# Patient Record
Sex: MALE | Race: BLACK OR AFRICAN AMERICAN | URBAN - METROPOLITAN AREA
[De-identification: names, ages, dates, MRNs, and addresses within clinical notes are randomized per-mention and may not be internally consistent; named-entity substitution may affect disease eponyms.]

---

## 2017-06-22 ENCOUNTER — HOSPITAL ENCOUNTER (INPATIENT)
Facility: CLINIC | Age: 37
LOS: 8 days | Discharge: HOME | End: 2017-07-01
Attending: EMERGENCY MEDICINE | Admitting: INTERNAL MEDICINE
Payer: MEDICAID

## 2017-06-22 ENCOUNTER — APPOINTMENT (OUTPATIENT)
Dept: GENERAL RADIOLOGY | Facility: CLINIC | Age: 37
End: 2017-06-22
Attending: EMERGENCY MEDICINE
Payer: MEDICAID

## 2017-06-22 ENCOUNTER — APPOINTMENT (OUTPATIENT)
Dept: CT IMAGING | Facility: CLINIC | Age: 37
End: 2017-06-22
Attending: EMERGENCY MEDICINE
Payer: MEDICAID

## 2017-06-22 DIAGNOSIS — R10.12 LUQ ABDOMINAL PAIN: ICD-10-CM

## 2017-06-22 DIAGNOSIS — R11.2 INTRACTABLE VOMITING WITH NAUSEA, UNSPECIFIED VOMITING TYPE: ICD-10-CM

## 2017-06-22 PROBLEM — K52.9 GASTROENTERITIS: Status: ACTIVE | Noted: 2017-06-22

## 2017-06-22 LAB
ALBUMIN SERPL-MCNC: 3.4 G/DL (ref 3.4–5)
ALBUMIN UR-MCNC: NEGATIVE MG/DL
ALP SERPL-CCNC: 46 U/L (ref 40–150)
ALT SERPL W P-5'-P-CCNC: 20 U/L (ref 0–70)
ANION GAP SERPL CALCULATED.3IONS-SCNC: 8 MMOL/L (ref 3–14)
APPEARANCE UR: CLEAR
AST SERPL W P-5'-P-CCNC: 11 U/L (ref 0–45)
BASOPHILS # BLD AUTO: 0 10E9/L (ref 0–0.2)
BASOPHILS NFR BLD AUTO: 0.2 %
BILIRUB SERPL-MCNC: 0.8 MG/DL (ref 0.2–1.3)
BILIRUB UR QL STRIP: NEGATIVE
BUN SERPL-MCNC: 8 MG/DL (ref 7–30)
CALCIUM SERPL-MCNC: 8.6 MG/DL (ref 8.5–10.1)
CHLORIDE SERPL-SCNC: 101 MMOL/L (ref 94–109)
CHOLEST SERPL-MCNC: 109 MG/DL
CO2 SERPL-SCNC: 28 MMOL/L (ref 20–32)
COLOR UR AUTO: YELLOW
CREAT SERPL-MCNC: 1.14 MG/DL (ref 0.66–1.25)
DIFFERENTIAL METHOD BLD: ABNORMAL
EOSINOPHIL # BLD AUTO: 1.4 10E9/L (ref 0–0.7)
EOSINOPHIL NFR BLD AUTO: 7.1 %
ERYTHROCYTE [DISTWIDTH] IN BLOOD BY AUTOMATED COUNT: 13.2 % (ref 10–15)
GFR SERPL CREATININE-BSD FRML MDRD: 73 ML/MIN/1.7M2
GLUCOSE SERPL-MCNC: 96 MG/DL (ref 70–99)
GLUCOSE UR STRIP-MCNC: NEGATIVE MG/DL
HCT VFR BLD AUTO: 50.3 % (ref 40–53)
HDLC SERPL-MCNC: 36 MG/DL
HGB BLD-MCNC: 16.5 G/DL (ref 13.3–17.7)
HGB UR QL STRIP: NEGATIVE
IMM GRANULOCYTES # BLD: 0.1 10E9/L (ref 0–0.4)
IMM GRANULOCYTES NFR BLD: 0.5 %
KETONES UR STRIP-MCNC: 20 MG/DL
LACTATE BLD-SCNC: 1.4 MMOL/L (ref 0.7–2.1)
LDLC SERPL CALC-MCNC: 57 MG/DL
LEUKOCYTE ESTERASE UR QL STRIP: NEGATIVE
LIPASE SERPL-CCNC: 39 U/L (ref 73–393)
LYMPHOCYTES # BLD AUTO: 0.9 10E9/L (ref 0.8–5.3)
LYMPHOCYTES NFR BLD AUTO: 4.7 %
MCH RBC QN AUTO: 27.5 PG (ref 26.5–33)
MCHC RBC AUTO-ENTMCNC: 32.8 G/DL (ref 31.5–36.5)
MCV RBC AUTO: 84 FL (ref 78–100)
MONOCYTES # BLD AUTO: 0.5 10E9/L (ref 0–1.3)
MONOCYTES NFR BLD AUTO: 2.6 %
MUCOUS THREADS #/AREA URNS LPF: PRESENT /LPF
NEUTROPHILS # BLD AUTO: 16.5 10E9/L (ref 1.6–8.3)
NEUTROPHILS NFR BLD AUTO: 84.9 %
NITRATE UR QL: NEGATIVE
NONHDLC SERPL-MCNC: 73 MG/DL
NRBC # BLD AUTO: 0 10*3/UL
NRBC BLD AUTO-RTO: 0 /100
PH UR STRIP: 6 PH (ref 5–7)
PLATELET # BLD AUTO: 240 10E9/L (ref 150–450)
POTASSIUM SERPL-SCNC: 4 MMOL/L (ref 3.4–5.3)
PROT SERPL-MCNC: 6.6 G/DL (ref 6.8–8.8)
RBC # BLD AUTO: 5.99 10E12/L (ref 4.4–5.9)
RBC #/AREA URNS AUTO: 4 /HPF (ref 0–2)
SODIUM SERPL-SCNC: 137 MMOL/L (ref 133–144)
SP GR UR STRIP: 1.01 (ref 1–1.03)
TRIGL SERPL-MCNC: 79 MG/DL
TROPONIN I SERPL-MCNC: NORMAL UG/L (ref 0–0.04)
URN SPEC COLLECT METH UR: ABNORMAL
UROBILINOGEN UR STRIP-MCNC: 0 MG/DL (ref 0–2)
WBC # BLD AUTO: 19.4 10E9/L (ref 4–11)
WBC #/AREA URNS AUTO: 1 /HPF (ref 0–2)

## 2017-06-22 PROCEDURE — 85025 COMPLETE CBC W/AUTO DIFF WBC: CPT | Performed by: EMERGENCY MEDICINE

## 2017-06-22 PROCEDURE — 87040 BLOOD CULTURE FOR BACTERIA: CPT | Performed by: EMERGENCY MEDICINE

## 2017-06-22 PROCEDURE — 84484 ASSAY OF TROPONIN QUANT: CPT | Performed by: EMERGENCY MEDICINE

## 2017-06-22 PROCEDURE — 99207 ZZC CDG-HISTORY COMP: MEETS EXP. PROBLEM FOCUSED-DOWN CODED LACK OF ROS: CPT | Performed by: INTERNAL MEDICINE

## 2017-06-22 PROCEDURE — 93005 ELECTROCARDIOGRAM TRACING: CPT

## 2017-06-22 PROCEDURE — 74177 CT ABD & PELVIS W/CONTRAST: CPT

## 2017-06-22 PROCEDURE — 96374 THER/PROPH/DIAG INJ IV PUSH: CPT

## 2017-06-22 PROCEDURE — 81001 URINALYSIS AUTO W/SCOPE: CPT | Performed by: EMERGENCY MEDICINE

## 2017-06-22 PROCEDURE — 71020 XR CHEST 2 VW: CPT

## 2017-06-22 PROCEDURE — 80061 LIPID PANEL: CPT | Performed by: INTERNAL MEDICINE

## 2017-06-22 PROCEDURE — 99218 ZZC INITIAL OBSERVATION CARE,LEVL I: CPT | Performed by: INTERNAL MEDICINE

## 2017-06-22 PROCEDURE — 96375 TX/PRO/DX INJ NEW DRUG ADDON: CPT

## 2017-06-22 PROCEDURE — 76604 US EXAM CHEST: CPT

## 2017-06-22 PROCEDURE — 96361 HYDRATE IV INFUSION ADD-ON: CPT

## 2017-06-22 PROCEDURE — 25000128 H RX IP 250 OP 636: Performed by: EMERGENCY MEDICINE

## 2017-06-22 PROCEDURE — 36415 COLL VENOUS BLD VENIPUNCTURE: CPT

## 2017-06-22 PROCEDURE — G0378 HOSPITAL OBSERVATION PER HR: HCPCS

## 2017-06-22 PROCEDURE — 83690 ASSAY OF LIPASE: CPT | Performed by: EMERGENCY MEDICINE

## 2017-06-22 PROCEDURE — 99285 EMERGENCY DEPT VISIT HI MDM: CPT | Mod: 25

## 2017-06-22 PROCEDURE — 80061 LIPID PANEL: CPT | Performed by: EMERGENCY MEDICINE

## 2017-06-22 PROCEDURE — 36415 COLL VENOUS BLD VENIPUNCTURE: CPT | Performed by: EMERGENCY MEDICINE

## 2017-06-22 PROCEDURE — 25000128 H RX IP 250 OP 636: Performed by: INTERNAL MEDICINE

## 2017-06-22 PROCEDURE — 25000132 ZZH RX MED GY IP 250 OP 250 PS 637: Performed by: INTERNAL MEDICINE

## 2017-06-22 PROCEDURE — 25800025 ZZH RX 258: Performed by: INTERNAL MEDICINE

## 2017-06-22 PROCEDURE — 80053 COMPREHEN METABOLIC PANEL: CPT | Performed by: EMERGENCY MEDICINE

## 2017-06-22 PROCEDURE — 83605 ASSAY OF LACTIC ACID: CPT | Performed by: EMERGENCY MEDICINE

## 2017-06-22 RX ORDER — ONDANSETRON 2 MG/ML
4 INJECTION INTRAMUSCULAR; INTRAVENOUS EVERY 6 HOURS PRN
Status: DISCONTINUED | OUTPATIENT
Start: 2017-06-22 | End: 2017-07-01 | Stop reason: HOSPADM

## 2017-06-22 RX ORDER — ONDANSETRON 4 MG/1
4 TABLET, ORALLY DISINTEGRATING ORAL EVERY 6 HOURS PRN
Status: DISCONTINUED | OUTPATIENT
Start: 2017-06-22 | End: 2017-07-01 | Stop reason: HOSPADM

## 2017-06-22 RX ORDER — IOPAMIDOL 755 MG/ML
500 INJECTION, SOLUTION INTRAVASCULAR ONCE
Status: COMPLETED | OUTPATIENT
Start: 2017-06-22 | End: 2017-06-22

## 2017-06-22 RX ORDER — ASPIRIN 325 MG
650 TABLET ORAL DAILY PRN
COMMUNITY

## 2017-06-22 RX ORDER — ACETAMINOPHEN 325 MG/1
650 TABLET ORAL EVERY 4 HOURS PRN
Status: DISCONTINUED | OUTPATIENT
Start: 2017-06-22 | End: 2017-07-01 | Stop reason: HOSPADM

## 2017-06-22 RX ORDER — DEXTROSE MONOHYDRATE, SODIUM CHLORIDE, AND POTASSIUM CHLORIDE 50; 1.49; 9 G/1000ML; G/1000ML; G/1000ML
INJECTION, SOLUTION INTRAVENOUS CONTINUOUS
Status: DISCONTINUED | OUTPATIENT
Start: 2017-06-22 | End: 2017-06-23

## 2017-06-22 RX ORDER — PROCHLORPERAZINE 25 MG
25 SUPPOSITORY, RECTAL RECTAL EVERY 12 HOURS PRN
Status: DISCONTINUED | OUTPATIENT
Start: 2017-06-22 | End: 2017-07-01 | Stop reason: HOSPADM

## 2017-06-22 RX ORDER — HYDROMORPHONE HYDROCHLORIDE 1 MG/ML
0.5 INJECTION, SOLUTION INTRAMUSCULAR; INTRAVENOUS; SUBCUTANEOUS ONCE
Status: COMPLETED | OUTPATIENT
Start: 2017-06-22 | End: 2017-06-22

## 2017-06-22 RX ORDER — NALOXONE HYDROCHLORIDE 0.4 MG/ML
.1-.4 INJECTION, SOLUTION INTRAMUSCULAR; INTRAVENOUS; SUBCUTANEOUS
Status: DISCONTINUED | OUTPATIENT
Start: 2017-06-22 | End: 2017-07-01 | Stop reason: HOSPADM

## 2017-06-22 RX ORDER — ONDANSETRON 2 MG/ML
4 INJECTION INTRAMUSCULAR; INTRAVENOUS ONCE
Status: COMPLETED | OUTPATIENT
Start: 2017-06-22 | End: 2017-06-22

## 2017-06-22 RX ORDER — HYDROMORPHONE HYDROCHLORIDE 1 MG/ML
.3-.5 INJECTION, SOLUTION INTRAMUSCULAR; INTRAVENOUS; SUBCUTANEOUS
Status: DISCONTINUED | OUTPATIENT
Start: 2017-06-22 | End: 2017-06-23

## 2017-06-22 RX ORDER — OXYCODONE HYDROCHLORIDE 5 MG/1
5-10 TABLET ORAL
Status: DISCONTINUED | OUTPATIENT
Start: 2017-06-22 | End: 2017-06-30

## 2017-06-22 RX ORDER — LIDOCAINE 40 MG/G
CREAM TOPICAL
Status: DISCONTINUED | OUTPATIENT
Start: 2017-06-22 | End: 2017-07-01 | Stop reason: HOSPADM

## 2017-06-22 RX ORDER — PROCHLORPERAZINE MALEATE 5 MG
5-10 TABLET ORAL EVERY 6 HOURS PRN
Status: DISCONTINUED | OUTPATIENT
Start: 2017-06-22 | End: 2017-07-01 | Stop reason: HOSPADM

## 2017-06-22 RX ADMIN — HYDROMORPHONE HYDROCHLORIDE 0.5 MG: 1 INJECTION, SOLUTION INTRAMUSCULAR; INTRAVENOUS; SUBCUTANEOUS at 23:33

## 2017-06-22 RX ADMIN — ACETAMINOPHEN 650 MG: 325 TABLET, FILM COATED ORAL at 23:40

## 2017-06-22 RX ADMIN — ONDANSETRON 4 MG: 2 INJECTION INTRAMUSCULAR; INTRAVENOUS at 18:45

## 2017-06-22 RX ADMIN — IOPAMIDOL 100 ML: 755 INJECTION, SOLUTION INTRAVENOUS at 19:26

## 2017-06-22 RX ADMIN — SODIUM CHLORIDE 1000 ML: 9 INJECTION, SOLUTION INTRAVENOUS at 22:15

## 2017-06-22 RX ADMIN — HYDROMORPHONE HYDROCHLORIDE 0.5 MG: 1 INJECTION, SOLUTION INTRAMUSCULAR; INTRAVENOUS; SUBCUTANEOUS at 18:47

## 2017-06-22 RX ADMIN — SODIUM CHLORIDE 1000 ML: 9 INJECTION, SOLUTION INTRAVENOUS at 18:45

## 2017-06-22 RX ADMIN — SODIUM CHLORIDE 65 ML: 9 INJECTION, SOLUTION INTRAVENOUS at 19:27

## 2017-06-22 RX ADMIN — POTASSIUM CHLORIDE, DEXTROSE MONOHYDRATE AND SODIUM CHLORIDE: 150; 5; 900 INJECTION, SOLUTION INTRAVENOUS at 23:38

## 2017-06-22 RX ADMIN — RANITIDINE 150 MG: 150 TABLET ORAL at 23:40

## 2017-06-22 ASSESSMENT — ENCOUNTER SYMPTOMS
DIAPHORESIS: 1
DYSURIA: 0
VOMITING: 1
HEMATURIA: 0
ABDOMINAL PAIN: 1
NAUSEA: 1
DIARRHEA: 0
COUGH: 0
FEVER: 1
SORE THROAT: 0

## 2017-06-22 NOTE — ED PROVIDER NOTES
"  History     Chief Complaint:  Abdominal pain and chest pain    HPI   René Grigsby is a 36 year old male with a history of CKD who presents with abdominal pain and chest pain. Two days ago, the patient began experiencing intermittent 20 second episodes of upper abdominal pain every 3-4 minutes. Last night, the patient had a fever of 103. Around 0400 this morning, the patient began to experience nausea and vomiting and noticed a small amount of blood in his vomit. His last bowel movement was yesterday which is abnormal for him and he has not been urinating as much as he usually does. No dysuria or hematuria. No diarrhea. No cough or sore throat. He has not been around anyone else who has been sick. No recent travel.     The patient also reports intermittent left sided chest pain with activity for the past 2-3 months. The patient reports experiencing an episode of chest pain and diaphoresis while jogging during a softball game a week ago. He does not have any chest pain currently.    Allergies:  No known drug allergies.    Medications:    Aspirin    Past Medical History:    CKD    Past Surgical History:    History reviewed. No pertinent past surgical history.    Family History:    History reviewed. No pertinent family history.    Social History:  Marital Status: single  Presents to the ED with his family  Tobacco Use: former smoker  Alcohol Use: negative  [facility]     Review of Systems   Constitutional: Positive for diaphoresis and fever.   HENT: Negative for sore throat.    Respiratory: Negative for cough.    Cardiovascular: Positive for chest pain.   Gastrointestinal: Positive for abdominal pain, nausea and vomiting. Negative for diarrhea.   Genitourinary: Negative for dysuria and hematuria.   All other systems reviewed and are negative.    Physical Exam   First Vitals:  BP: 117/74  Pulse: 120  Temp: 100.1  F (37.8  C)  Resp: 18  Height: 175.3 cm (5' 9\")  Weight: 102.1 kg (225 " lb)  SpO2: 100 %    Physical Exam  Constitutional: The patient is oriented to person, place, and time. Alert and cooperative.  HENT:   Right Ear: External ear normal.   Left Ear: External ear normal.   Nose: Nose normal.   Mouth/Throat: Uvula is midline, oropharynx is clear and moist and mucous membranes are normal. No posterior oropharyngeal edema or erythema.   Eyes: Conjunctivae, EOM and lids are normal. Pupils are equal, round, and reactive to light.   Neck: Trachea normal. Normal range of motion. Neck supple.   Cardiovascular: tachycardia, regular rhythm, normal heart sounds, and intact distal pulses.    Pulmonary/Chest: Effort normal and breath sounds equal bilaterally. No crackles or wheezing.   Abdominal: Soft. Tenderness to palpation in the epigastrium and throughout the L abdomen. No rebound and no guarding.   Musculoskeletal: Normal range of motion.  No extremity tenderness or edema.  Neurological: Alert and Oriented. Strength 5/5 in upper and lower extremities bilaterally. Sensation intact to light touch throughout.  Skin: Skin is dry. No rash noted.          Emergency Department Course   ECG:  @ 1848  Indication: abdominal pain and chest pain  Vent. Rate 108 bpm. FL interval 124 ms. QRS duration 88 ms. QT/QTc 326/436 ms. P-R-T axis 56 46 19.   Sinus tachycardia. Otherwise normal ECG.    Read @ 1854 by Dr. Myers.    Imaging:  Radiographic findings were communicated with the patient who voiced understanding of the findings.    CT Abdomen Pelvis w Contrast  1. Trace bilateral pleural effusions and trace pericardial effusion.  2. The appendix is at the upper limits of normal in size. No  surrounding inflammation and no convincing evidence of appendicitis.  3. No other acute abnormality. No bowel obstruction or inflammation.   Preliminary radiology read.      Chest XR, PA & LAT  No acute abnormality.   Preliminary radiology read.      Laboratory:  CBC:  WBC 19.4 (H), HGB 16.5,   CMP: protein total  6.6 (L), otherwise WNL (Creatinine 1.14)  Lipase: 39 (L)  Troponin: <0.015  Lactic acid: 1.4  Blood culture x2: pending    UA: Clear yellow urine, urineketon 20, RBC/HPF 4 (H), mucous present, otherwise WNL    Procedures:    Limited Bedside Screening Ultrasound     PERFORMED BY: Dr. Myers  BODY AREA IMAGED: Cardiac  INDICATIONS: Chest pain  FINDINGS: Trace pericardial Effusion. No obvious wall motion abnormalities. Good cardiac squeeze.         Interventions:  (1957) Normal Saline, 1 liter, IV bolus  (1847) Dilaudid, 0.5 mg, IV injection  (1845) Zofran, 4 mg, IV injection    Emergency Department Course:  Nursing notes and vitals reviewed.  I performed an exam of the patient as documented above.   A peripheral IV was established. Blood was drawn from the patient. This was sent for laboratory testing, findings above.   Urine sample was obtained and sent for laboratory analysis, findings above.  The patient was sent for a abdomen/pelvis CT and chest x-ray while in the emergency department, findings above.   I performed a bedside ultrasound as documented above.  Findings and plan explained to the patient who consents to admission.   (2156) I discussed the patient with Dr. Rosenberg of the hospitalist service, who will admit the patient to a observation bed for further monitoring, evaluation, and treatment.  I personally reviewed the laboratory results with the patient and answered all related questions prior to discharge.     Impression & Plan    Medical Decision Making:  René Grigsby is a 36 year old male who presents to the ED for evaluation of abdominal pain and fever. Upon presentation to the ED, the patient is nontoxic appearing. He is tachycardic and has a low grade temperature, though his vital signs are otherwise within normal limits and stable. On exam, he is well appearing. He is alert, oriented, and neuro exam is nonfocal. Aside from tachycardia, cardiopulmonary exam is unremarkable. On abdominal  examination, he does endorse tenderness to palpation in the epigastrium and throughout the left side of the abdomen. He has no significant tenderness to palpation throughout the right abdomen. The rest of his exam is as mentioned above. Given that the patient does endorse a temperature of 103 at home and is tachycardic, a septic workup was performed. Labs were obtained and are as mentioned above. Notably, he does have a leukocytosis, but lactate is within normal limits. CT of the abdomen was obtained initially and demonstrates trace bilateral pleural effusions and a trace pericardial effusion. The appendix is at the upper limits of normal size, however there is no convincing evidence of appendicitis. There is no other acute abnormality noted. These results were discussed with the patient, he notes understanding. A chest x-ray was then obtained and demonstrates no acute abnormality. I did perform a bedside ultrasound of the heart and this demonstrates a trace pleural effusion, but no evidence of a large pleural effusion. EKG demonstrates sinus rhythm. There are no concerning acute ischemic changes. There is no evidence to suggest an acute pericarditis. Overall, the etiology of the patient's fever and leukocytosis is unclear at this time. He has no evidence of an intraabdominal infectious process, evidence of pneumonia, or evidence of urinary tract infection. He denies any rash to suggest cellulitis. The patient continues to endorse significant abdominal pain and states he has been unable to hold fluids or food down at home. Therefore, I do feel that observation admission for symptomatic management is indicated at this time. Although the patient does have a leukocytosis with a low grade temperature here, given that there is no obvious evidence of a bacterial infection, I do not feel that antibiotics are indicated at this time. The patient was discussed with the hospitalist and he agreed to accept the patient. He was  stable/improved at the time of admission.      Diagnosis:    ICD-10-CM    1. LUQ abdominal pain R10.12 Blood culture     Blood culture   2. Intractable vomiting with nausea, unspecified vomiting type R11.2        Disposition:  Admit to observation.    Rancho SHAY, am serving as a scribe on 6/22/2017 at 6:29 PM to personally document services performed by Dr. Myers based on my observations and the provider's statements to me.        Maribel Myers MD  06/23/17 0212

## 2017-06-22 NOTE — ED NOTES
Pt developed abd with nausea and vomiting on Tuesday. Pt has been running a fever up to 103 on Tuesday night. No vomiting today. Pt has not been able to keep anything down except bread and water. Pt denies diarrhea or constipation.

## 2017-06-22 NOTE — IP AVS SNAPSHOT
MRN:[id number]                      After Visit Summary   6/22/2017    René Grigsby    MRN: [id number]           Thank you!     Thank you for choosing [facility] for your care. Our goal is always to provide you with excellent care. Hearing back from our patients is one way we can continue to improve our services. Please take a few minutes to complete the written survey that you may receive in the mail after you visit. If you would like to speak to someone directly about your visit please contact Patient Relations at [phone]. Thank you!          Patient Information     Date Of Birth          1980        Designated Caregiver       Most Recent Value    Caregiver    Will someone help with your care after discharge? yes    Name of designated caregiver Kanu [wife]    Phone number of caregiver [phone]    Caregiver address [place]      About your hospital stay     You were admitted on:  June 22, 2017 You last received care in the:  [place] [address] [facility]    You were discharged on:  July 1, 2017       Who to Call     For medical emergencies, please call 911.  For non-urgent questions about your medical care, please call your primary care provider or clinic, [phone]          Attending Provider     Provider Specialty    Maribel Myers MD Emergency Medicine    Yousuf Rosenberg MD Internal Medicine    Amy Jackson DO Internal Medicine       Primary Care Provider Office Phone # Fax #    [facility] [phone]      Follow-up Appointments     Follow-up and recommended labs and tests        F/U with primary MD on Monday or Wednesday for recheck of CBC and to f/u some outstanding blood work  Consider referral to rheumatology at that time  F/U with Dr. Mccullough in 7-10 days  You have a mildly dilated right ventricle, you should have repeat echo in 6 months  You have a strong family hx of coronary disease, your  "primary may consider more specialized lipid testing                  Pending Results     Date and Time Order Name Status Description    2017 1531 Antineutrophil cytoplasmic Nayeli IgG In process     2017 0850 EBV Capsid Antibody IgM In process     2017 0850 Ova and Parasite Exam Routine In process     2017 0850 Strongyloides antibody IgG In process     2017 0001 Porphyrins Total Reflx Fraction In process     2017 1740 Porphyrins Quantitative Urine In process     2017 1239 Lyme IgG and IgM CSF Immunoblot: Tube 3 In process             Statement of Approval     Ordered          17 1704  I have reviewed and agree with all the recommendations and orders detailed in this document.  EFFECTIVE NOW     Approved and electronically signed by:  Teresita Chavis MD             Admission Information     Date & Time Provider Department Dept. Phone    [address] Amy Jackson,  [place] [address] [facility] [phone]      Your Vitals Were     Blood Pressure Pulse Temperature Respirations Height Weight    109/65 (BP Location: Right arm) 77 97  F (36.1  C) (Axillary) 16 1.753 m (5' 9\") 100.4 kg (221 lb 4.8 oz)    Pulse Oximetry BMI (Body Mass Index)                97% 32.68 kg/m2          MyChart Information     [employer] lets you send messages to your doctor, view your test results, renew your prescriptions, schedule appointments and more. To sign up, go to [email or web address] . Click on \"Log in\" on the left side of the screen, which will take you to the Welcome page. Then click on \"Sign up Now\" on the right side of the page.     You will be asked to enter the access code listed below, as well as some personal information. Please follow the directions to create your username and password.     Your access code is: [email or web address]  Expires: 2017  8:43 PM     Your access code will  in 90 days. If you need help or a new code, please call your [place] clinic or " [phone].        Care EveryWhere ID     This is your Care EveryWhere ID. This could be used by other organizations to access your [facility] medical records  [id number]        Equal Access to Services     MIGUEL ANGEL ADDISON : Hadii aad ku hadallenoswaldo Matthew, waaxda luqadaha, qaybta kaalmada adejaved, taiwo jain deni qiu. So [facility] [phone].    ATENCIÓN: Si habla español, tiene a gonzalez disposición servicios gratuitos de asistencia lingüística. Llame al [phone].    We comply with applicable federal civil rights laws and [place] laws. We do not discriminate on the basis of race, color, national origin, age, disability sex, sexual orientation or gender identity.               Review of your medicines      START taking        Dose / Directions    HYDROmorphone 2 MG tablet   Commonly known as:  DILAUDID   Used for:  LUQ abdominal pain        Dose:  2 mg   Take 1 tablet (2 mg) by mouth every 4 hours as needed for moderate to severe pain   Quantity:  20 tablet   Refills:  0         CONTINUE these medicines which have NOT CHANGED        Dose / Directions    aspirin 325 MG tablet        Dose:  650 mg   Take 650 mg by mouth daily as needed   Refills:  0            Where to get your medicines      Some of these will need a paper prescription and others can be bought over the counter. Ask your nurse if you have questions.     Bring a paper prescription for each of these medications     HYDROmorphone 2 MG tablet                Protect others around you: Learn how to safely use, store and throw away your medicines at www.disposemymeds.org.             Medication List: This is a list of all your medications and when to take them. Check marks below indicate your daily home schedule. Keep this list as a reference.      Medications           Morning Afternoon Evening Bedtime As Needed    aspirin 325 MG tablet   Take 650 mg by mouth daily as needed                                HYDROmorphone 2 MG tablet    Commonly known as:  DILAUDID   Take 1 tablet (2 mg) by mouth every 4 hours as needed for moderate to severe pain   Last time this was given:  2 mg on 7/1/2017  3:22 PM

## 2017-06-22 NOTE — IP AVS SNAPSHOT
[place] [address] [facility]    [address]    [facility] [address]    Phone:  [phone]    Fax:  [phone]                                       After Visit Summary   6/22/2017    René Grigsby    MRN: [id number]           After Visit Summary Signature Page     I have received my discharge instructions, and my questions have been answered. I have discussed any challenges I see with this plan with the nurse or doctor.    ..........................................................................................................................................  Patient/Patient Representative Signature      ..........................................................................................................................................  Patient Representative Print Name and Relationship to Patient    ..................................................               ................................................  Date                                            Time    ..........................................................................................................................................  Reviewed by Signature/Title    ...................................................              ..............................................  Date                                                            Time

## 2017-06-23 ENCOUNTER — APPOINTMENT (OUTPATIENT)
Dept: CT IMAGING | Facility: CLINIC | Age: 37
End: 2017-06-23
Attending: INTERNAL MEDICINE
Payer: MEDICAID

## 2017-06-23 ENCOUNTER — APPOINTMENT (OUTPATIENT)
Dept: ULTRASOUND IMAGING | Facility: CLINIC | Age: 37
End: 2017-06-23
Attending: INTERNAL MEDICINE
Payer: MEDICAID

## 2017-06-23 PROBLEM — R10.9 ABDOMINAL PAIN: Status: ACTIVE | Noted: 2017-06-23

## 2017-06-23 LAB
ALBUMIN SERPL-MCNC: 2.3 G/DL (ref 3.4–5)
ALP SERPL-CCNC: 40 U/L (ref 40–150)
ALT SERPL W P-5'-P-CCNC: 13 U/L (ref 0–70)
ANION GAP SERPL CALCULATED.3IONS-SCNC: 6 MMOL/L (ref 3–14)
AST SERPL W P-5'-P-CCNC: 8 U/L (ref 0–45)
BASOPHILS # BLD AUTO: 0 10E9/L (ref 0–0.2)
BASOPHILS NFR BLD AUTO: 0.1 %
BILIRUB SERPL-MCNC: 0.6 MG/DL (ref 0.2–1.3)
BUN SERPL-MCNC: 8 MG/DL (ref 7–30)
CALCIUM SERPL-MCNC: 7.7 MG/DL (ref 8.5–10.1)
CHLORIDE SERPL-SCNC: 103 MMOL/L (ref 94–109)
CK SERPL-CCNC: 98 U/L (ref 30–300)
CO2 SERPL-SCNC: 27 MMOL/L (ref 20–32)
CREAT SERPL-MCNC: 1.27 MG/DL (ref 0.66–1.25)
D DIMER PPP FEU-MCNC: 1.5 UG/ML FEU (ref 0–0.5)
DIFFERENTIAL METHOD BLD: ABNORMAL
EOSINOPHIL # BLD AUTO: 1.3 10E9/L (ref 0–0.7)
EOSINOPHIL NFR BLD AUTO: 8 %
ERYTHROCYTE [DISTWIDTH] IN BLOOD BY AUTOMATED COUNT: 13.5 % (ref 10–15)
ERYTHROCYTE [SEDIMENTATION RATE] IN BLOOD BY WESTERGREN METHOD: 4 MM/H (ref 0–15)
GFR SERPL CREATININE-BSD FRML MDRD: 64 ML/MIN/1.7M2
GLUCOSE SERPL-MCNC: 119 MG/DL (ref 70–99)
HCT VFR BLD AUTO: 49.4 % (ref 40–53)
HGB BLD-MCNC: 16.3 G/DL (ref 13.3–17.7)
IMM GRANULOCYTES # BLD: 0.1 10E9/L (ref 0–0.4)
IMM GRANULOCYTES NFR BLD: 0.6 %
INTERPRETATION ECG - MUSE: NORMAL
LACTATE BLD-SCNC: 0.9 MMOL/L (ref 0.7–2.1)
LIPASE SERPL-CCNC: 34 U/L (ref 73–393)
LYMPHOCYTES # BLD AUTO: 0.8 10E9/L (ref 0.8–5.3)
LYMPHOCYTES NFR BLD AUTO: 4.8 %
MCH RBC QN AUTO: 28.1 PG (ref 26.5–33)
MCHC RBC AUTO-ENTMCNC: 33 G/DL (ref 31.5–36.5)
MCV RBC AUTO: 85 FL (ref 78–100)
MONOCYTES # BLD AUTO: 0.3 10E9/L (ref 0–1.3)
MONOCYTES NFR BLD AUTO: 2.1 %
NEUTROPHILS # BLD AUTO: 13.4 10E9/L (ref 1.6–8.3)
NEUTROPHILS NFR BLD AUTO: 84.4 %
NRBC # BLD AUTO: 0 10*3/UL
NRBC BLD AUTO-RTO: 0 /100
PLATELET # BLD AUTO: 207 10E9/L (ref 150–450)
POTASSIUM SERPL-SCNC: 4.7 MMOL/L (ref 3.4–5.3)
PROCALCITONIN SERPL-MCNC: 0.19 NG/ML
PROT SERPL-MCNC: 5.5 G/DL (ref 6.8–8.8)
RBC # BLD AUTO: 5.81 10E12/L (ref 4.4–5.9)
SODIUM SERPL-SCNC: 136 MMOL/L (ref 133–144)
WBC # BLD AUTO: 15.9 10E9/L (ref 4–11)
WBC # BLD AUTO: 20.5 10E9/L (ref 4–11)

## 2017-06-23 PROCEDURE — 25000128 H RX IP 250 OP 636: Performed by: INTERNAL MEDICINE

## 2017-06-23 PROCEDURE — 84145 PROCALCITONIN (PCT): CPT | Performed by: INTERNAL MEDICINE

## 2017-06-23 PROCEDURE — 96376 TX/PRO/DX INJ SAME DRUG ADON: CPT

## 2017-06-23 PROCEDURE — 25000132 ZZH RX MED GY IP 250 OP 250 PS 637: Performed by: INTERNAL MEDICINE

## 2017-06-23 PROCEDURE — 90471 IMMUNIZATION ADMIN: CPT

## 2017-06-23 PROCEDURE — 71260 CT THORAX DX C+: CPT

## 2017-06-23 PROCEDURE — 90715 TDAP VACCINE 7 YRS/> IM: CPT | Performed by: INTERNAL MEDICINE

## 2017-06-23 PROCEDURE — 12000007 ZZH R&B INTERMEDIATE

## 2017-06-23 PROCEDURE — 85025 COMPLETE CBC W/AUTO DIFF WBC: CPT | Performed by: INTERNAL MEDICINE

## 2017-06-23 PROCEDURE — 85048 AUTOMATED LEUKOCYTE COUNT: CPT | Performed by: INTERNAL MEDICINE

## 2017-06-23 PROCEDURE — G0378 HOSPITAL OBSERVATION PER HR: HCPCS

## 2017-06-23 PROCEDURE — 83605 ASSAY OF LACTIC ACID: CPT | Performed by: INTERNAL MEDICINE

## 2017-06-23 PROCEDURE — 96361 HYDRATE IV INFUSION ADD-ON: CPT

## 2017-06-23 PROCEDURE — 25000125 ZZHC RX 250: Performed by: INTERNAL MEDICINE

## 2017-06-23 PROCEDURE — 99233 SBSQ HOSP IP/OBS HIGH 50: CPT | Performed by: INTERNAL MEDICINE

## 2017-06-23 PROCEDURE — 82550 ASSAY OF CK (CPK): CPT | Performed by: INTERNAL MEDICINE

## 2017-06-23 PROCEDURE — 36415 COLL VENOUS BLD VENIPUNCTURE: CPT | Performed by: INTERNAL MEDICINE

## 2017-06-23 PROCEDURE — 93970 EXTREMITY STUDY: CPT

## 2017-06-23 PROCEDURE — 83690 ASSAY OF LIPASE: CPT | Performed by: INTERNAL MEDICINE

## 2017-06-23 PROCEDURE — 85652 RBC SED RATE AUTOMATED: CPT | Performed by: INTERNAL MEDICINE

## 2017-06-23 PROCEDURE — 80053 COMPREHEN METABOLIC PANEL: CPT | Performed by: INTERNAL MEDICINE

## 2017-06-23 PROCEDURE — 93010 ELECTROCARDIOGRAM REPORT: CPT | Performed by: INTERNAL MEDICINE

## 2017-06-23 PROCEDURE — 25800025 ZZH RX 258: Performed by: INTERNAL MEDICINE

## 2017-06-23 PROCEDURE — 85379 FIBRIN DEGRADATION QUANT: CPT | Performed by: INTERNAL MEDICINE

## 2017-06-23 PROCEDURE — 93005 ELECTROCARDIOGRAM TRACING: CPT

## 2017-06-23 RX ORDER — IBUPROFEN 400 MG/1
400 TABLET, FILM COATED ORAL ONCE
Status: COMPLETED | OUTPATIENT
Start: 2017-06-23 | End: 2017-06-23

## 2017-06-23 RX ORDER — HYDROMORPHONE HYDROCHLORIDE 1 MG/ML
.3-.5 INJECTION, SOLUTION INTRAMUSCULAR; INTRAVENOUS; SUBCUTANEOUS
Status: DISCONTINUED | OUTPATIENT
Start: 2017-06-23 | End: 2017-06-30

## 2017-06-23 RX ORDER — IOPAMIDOL 755 MG/ML
500 INJECTION, SOLUTION INTRAVASCULAR ONCE
Status: COMPLETED | OUTPATIENT
Start: 2017-06-23 | End: 2017-06-23

## 2017-06-23 RX ORDER — HYDROMORPHONE HYDROCHLORIDE 1 MG/ML
.3-.5 INJECTION, SOLUTION INTRAMUSCULAR; INTRAVENOUS; SUBCUTANEOUS EVERY 4 HOURS PRN
Status: DISCONTINUED | OUTPATIENT
Start: 2017-06-23 | End: 2017-06-23

## 2017-06-23 RX ORDER — SODIUM CHLORIDE AND POTASSIUM CHLORIDE 150; 900 MG/100ML; MG/100ML
INJECTION, SOLUTION INTRAVENOUS CONTINUOUS
Status: DISCONTINUED | OUTPATIENT
Start: 2017-06-23 | End: 2017-06-26

## 2017-06-23 RX ORDER — ACETAMINOPHEN 10 MG/ML
1000 INJECTION, SOLUTION INTRAVENOUS EVERY 6 HOURS PRN
Status: DISCONTINUED | OUTPATIENT
Start: 2017-06-23 | End: 2017-06-24

## 2017-06-23 RX ADMIN — IOPAMIDOL 85 ML: 755 INJECTION, SOLUTION INTRAVENOUS at 13:47

## 2017-06-23 RX ADMIN — RANITIDINE 150 MG: 150 TABLET ORAL at 21:01

## 2017-06-23 RX ADMIN — ACETAMINOPHEN 650 MG: 325 TABLET, FILM COATED ORAL at 16:29

## 2017-06-23 RX ADMIN — POTASSIUM CHLORIDE AND SODIUM CHLORIDE: 900; 150 INJECTION, SOLUTION INTRAVENOUS at 16:00

## 2017-06-23 RX ADMIN — HYDROMORPHONE HYDROCHLORIDE 0.5 MG: 1 INJECTION, SOLUTION INTRAMUSCULAR; INTRAVENOUS; SUBCUTANEOUS at 16:53

## 2017-06-23 RX ADMIN — SODIUM CHLORIDE 100 ML: 9 INJECTION, SOLUTION INTRAVENOUS at 13:47

## 2017-06-23 RX ADMIN — RANITIDINE 150 MG: 150 TABLET ORAL at 08:38

## 2017-06-23 RX ADMIN — HYDROMORPHONE HYDROCHLORIDE 0.5 MG: 1 INJECTION, SOLUTION INTRAMUSCULAR; INTRAVENOUS; SUBCUTANEOUS at 13:07

## 2017-06-23 RX ADMIN — HYDROMORPHONE HYDROCHLORIDE 0.5 MG: 1 INJECTION, SOLUTION INTRAMUSCULAR; INTRAVENOUS; SUBCUTANEOUS at 21:01

## 2017-06-23 RX ADMIN — HYDROMORPHONE HYDROCHLORIDE 0.5 MG: 1 INJECTION, SOLUTION INTRAMUSCULAR; INTRAVENOUS; SUBCUTANEOUS at 08:38

## 2017-06-23 RX ADMIN — POTASSIUM CHLORIDE, DEXTROSE MONOHYDRATE AND SODIUM CHLORIDE: 150; 5; 900 INJECTION, SOLUTION INTRAVENOUS at 07:26

## 2017-06-23 RX ADMIN — SODIUM CHLORIDE 1000 ML: 9 INJECTION, SOLUTION INTRAVENOUS at 12:58

## 2017-06-23 RX ADMIN — CLOSTRIDIUM TETANI TOXOID ANTIGEN (FORMALDEHYDE INACTIVATED), CORYNEBACTERIUM DIPHTHERIAE TOXOID ANTIGEN (FORMALDEHYDE INACTIVATED), BORDETELLA PERTUSSIS TOXOID ANTIGEN (GLUTARALDEHYDE INACTIVATED), BORDETELLA PERTUSSIS FILAMENTOUS HEMAGGLUTININ ANTIGEN (FORMALDEHYDE INACTIVATED), BORDETELLA PERTUSSIS PERTACTIN ANTIGEN, AND BORDETELLA PERTUSSIS FIMBRIAE 2/3 ANTIGEN 0.5 ML: 5; 2; 2.5; 5; 3; 5 INJECTION, SUSPENSION INTRAMUSCULAR at 10:50

## 2017-06-23 RX ADMIN — ACETAMINOPHEN 650 MG: 325 TABLET, FILM COATED ORAL at 21:18

## 2017-06-23 RX ADMIN — IBUPROFEN 400 MG: 400 TABLET ORAL at 23:18

## 2017-06-23 ASSESSMENT — ACTIVITIES OF DAILY LIVING (ADL)
DRESS: 0-->INDEPENDENT
SWALLOWING: 0-->SWALLOWS FOODS/LIQUIDS WITHOUT DIFFICULTY
TRANSFERRING: 0-->INDEPENDENT
AMBULATION: 0-->INDEPENDENT
RETIRED_COMMUNICATION: 0-->UNDERSTANDS/COMMUNICATES WITHOUT DIFFICULTY
BATHING: 0-->INDEPENDENT
FALL_HISTORY_WITHIN_LAST_SIX_MONTHS: NO
COGNITION: 0 - NO COGNITION ISSUES REPORTED
RETIRED_EATING: 0-->INDEPENDENT
TOILETING: 0-->INDEPENDENT

## 2017-06-23 ASSESSMENT — PAIN DESCRIPTION - DESCRIPTORS
DESCRIPTORS: CRAMPING
DESCRIPTORS: SHARP

## 2017-06-23 NOTE — ED NOTES
[facility] [place]  ED Nurse Handoff Report    René Grigsby is a 36 year old male   ED Chief complaint: Abdominal Pain and Back Pain  . ED Diagnosis:   Final diagnoses:   LUQ abdominal pain   Intractable vomiting with nausea, unspecified vomiting type     Allergies: No Known Allergies    Code Status: Full Code  Activity level - Baseline/Home:  Independent. Activity Level - Current:   Stand with Assist. Lift room needed: No. Bariatric: No   Needed: No   Isolation: No. Infection: Not Applicable.     Vital Signs:   Vitals:    06/22/17 2045 06/22/17 2100 06/22/17 2214 06/22/17 2216   BP:  120/72 112/60    Pulse:       Resp:       Temp:       TempSrc:       SpO2: 95% 94%  96%   Weight:       Height:           Cardiac Rhythm:  ,      Pain level: 0-10 Pain Scale: 0  Patient confused: No. Patient Falls Risk: Yes.   Elimination Status: Has voided   Patient Report - Initial Complaint: Abdominal pain. Focused Assessment: GI- Nausea, vomiting, generalized abdominal that is tender to palpate in upper quadrants. Decreased appetite, dehydrated. Emesis around 0400 this am. Bowel sounds active x 4. - decreased urine output, even after drinking fluids per pt. Urine in ED dark odilia. Cardiac- tachycardic, c/o midsternal chest pain last week during sport/exercise, no chest pain currently. Respiratory- dyspnea on exertion last week with chest pain, no symptoms currently. Skin- warm top touch, c/o chills.  Tests Performed: Labs, EKG, CT, Chest Xray. Abnormal Results: WBC: 19.4 10e9/L. Lipase: 39 U/L. Ketones Urine: 20 mg/dL.   CT-  1. Trace bilateral pleural effusions and trace pericardial effusion.  2. The appendix is at the upper limits of normal in size. No  surrounding inflammation and no convincing evidence of appendicitis.  3. No other acute abnormality. No bowel obstruction or inflammation.   Chest Xray- normal  Treatments provided: 1 L NS bolus x 2, 4 mg Zofran x 1, 0.5 mg Dilaudid x 1  Family Comments: Family  was at bedside, went home now  OBS brochure/video discussed/provided to patient:  Yes  ED Medications:   Medications   0.9% sodium chloride BOLUS (0 mLs Intravenous Stopped 6/22/17 1957)   HYDROmorphone (PF) (DILAUDID) injection 0.5 mg (0.5 mg Intravenous Given 6/22/17 1847)   ondansetron (ZOFRAN) injection 4 mg (4 mg Intravenous Given 6/22/17 1845)   iopamidol (ISOVUE-370) solution 500 mL (100 mLs Intravenous Given 6/22/17 1926)   0.9% sodium chloride BOLUS (0 mLs Intravenous Stopped 6/22/17 1930)   0.9% sodium chloride BOLUS (0 mLs Intravenous Stopped 6/22/17 2225)     Drips infusing:  No         ED Nurse Name/Phone Number: Melina Dawkins,   10:30 PM    RECEIVING UNIT ED HANDOFF REVIEW    Above ED Nurse Handoff Report was reviewed: Yes  Reviewed by: Simone Pham on June 22, 2017 at 10:43 PM

## 2017-06-23 NOTE — PROGRESS NOTES
Patient ambulated in room 3 feet.  Patient had chest pain, bilateral leg pain that radiated into his buttocks and lower back.  Patient was short of breath with standing and walking.  Patient was monitored on O2 saturation and dropped to 83% after walking 3 feet and shortness of breath got worse. He also stated that he was getting dizziness the further he walked. Oxygen saturation went up to 95% when sitting and patient states shortness of breath subsided when sitting. RN notified of O2 saturations.

## 2017-06-23 NOTE — PROGRESS NOTES
Pt's WBC returned more elevated at 20.5.  He ambulated in the halls and oxygenation dropped down to the 80's and HR went up to the 140's.  EKG now reviewed by myself - sinus tachy at 118 bpm.  Awaiting CT scan of the chest.

## 2017-06-23 NOTE — PLAN OF CARE
Problem: Discharge Planning  Goal: Discharge Planning (Adult, OB, Behavioral, Peds)  Outcome: No Change  Problem: Discharge Planning  Goal: Discharge Planning (Adult, OB, Behavioral, Peds)  Outcome: No Change  PRIMARY DIAGNOSIS: GASTROENTERITIS      OUTPATIENT/OBSERVATION GOALS TO BE MET BEFORE DISCHARGE  1. Orthostatic performed: No      2. Tolerating PO fluid and/or antibiotics (if applicable): Yes      3. Nausea/Vomiting/Diarrhea symptoms improved: Yes. Denies N/V/D, no emesis in 24 hours.       4. Pain status: Improved but still requiring IV narcotics. 0.5 mg IV dilaudid administered x1.      5. Return to near baseline physical activity: Yes      Discharge Planner Nurse   Safe discharge environment identified: Yes  Barriers to discharge: Yes, continues intermittent abdominal pain.          Please review provider order for any additional goals.   Nurse to notify provider when observation goals have been met and patient is ready for discharge.      Patient able to get some quality rest after IV dilaudid. Continues intermittent abdominal pain 3/10. IVF infusing. Vital sign stable now. Lactic protocol triggered, lactic acid 0.9. WBC trending down.

## 2017-06-23 NOTE — PLAN OF CARE
Problem: Discharge Planning  Goal: Discharge Planning (Adult, OB, Behavioral, Peds)  Outcome: No Change  PRIMARY DIAGNOSIS: GASTROENTERITIS     OUTPATIENT/OBSERVATION GOALS TO BE MET BEFORE DISCHARGE  1. Orthostatic performed: No     2. Tolerating PO fluid and/or antibiotics (if applicable): Yes     3. Nausea/Vomiting/Diarrhea symptoms improved: Yes     4. Pain status: Improved but still requiring IV narcotics.     5. Return to near baseline physical activity: Yes     Discharge Planner Nurse   Safe discharge environment identified: Yes  Barriers to discharge: Yes, continues intermittent abdominal pain.          Please review provider order for any additional goals.   Nurse to notify provider when observation goals have been met and patient is ready for discharge.

## 2017-06-23 NOTE — PROVIDER NOTIFICATION
6:18 AM  Paged admitting MD regarding patients new reports of tightness in thighs. Muscles feel like they are mare. Muscles hard and tight to touch. Patient reports cutting thumb on a marie water heater on Monday. Patient unsure of last tetanus shot. Continues intermittent abdominal pain.     6:28 AM  MD recommends to notify PA.

## 2017-06-23 NOTE — PROGRESS NOTES
ROOM # 203    Living Situation (if not independent, order SW consult):  Facility name:  : [facility]    Activity level at baseline: Independent  Activity level on admit: SBA d/t pain medication      Patient registered to observation; given Patient Bill of Rights; given the opportunity to ask questions about observation status and their plan of care.  Patient has been oriented to the observation room, bathroom and call light is in place.    Discussed discharge goals and expectations with patient/family.

## 2017-06-23 NOTE — H&P
CHIEF COMPLAINT:  Abdominal pain.      HISTORY OF PRESENT ILLNESS:  Mr. René Grigsby is a 36-year-old man who presents with 2 days of upper abdominal pain.  He is also having some fevers and nausea.  He vomited 3 times over the past couple of days.  He has been able to keep down some liquids, otherwise has not eaten much.  He does not have any history of gastrointestinal problems and has had no known sick contacts.  He has had no dysuria and no diarrhea.  He has had no bowel movements for the past couple of days.      PAST MEDICAL HISTORY:     1. Vasectomy.      MEDICATIONS:   1.  Aspirin 325 mg a day.      ALLERGIES:  No known drug allergies.      FAMILY HISTORY:  His father had a myocardial infarction and  at age 58.  His grandfather also  of a myocardial infarction and he has 4 uncles who  of coronary artery disease.      SOCIAL HISTORY:  He smoked for about 10 years and quit 3 years.  He works as a .      REVIEW OF SYSTEMS:   CARDIOVASCULAR:  He reports recently having some chest pain and dyspnea with exertion.  This has been new over the past few months.  He is currently not having any chest pain.      PHYSICAL EXAMINATION:   VITAL SIGNS:  Blood pressure is 120/72, pulse 109, temperature 100.1, oxygen saturation 94% on room air.   GENERAL:  He appears uncomfortable, sleepy, but easily arousable, cooperative.   HEENT:  Pupils round and equal.  Conjunctiva, sclerae and lids are within normal limits.   NECK:  Supple, with no masses, no thyromegaly.  Oropharynx is pink and moist.  Teeth and lips are within normal limits.     HEART:  Tachycardic with a regular rhythm, no murmurs.   LUNGS:  Clear to auscultation bilaterally with normal respiratory effort.   ABDOMEN:  Soft.  He has lower abdominal tenderness bilaterally to palpation and mid epigastric tenderness to palpation.  There is no guarding or rebound tenderness.  Bowel sounds are active.   EXTREMITIES:  There is no edema.   NEUROLOGIC:   Strength and sensation are intact in all 4 extremities.  Cranial nerves II through XII are grossly intact.   PSYCHIATRIC:  Mood and affect appear within normal limits.   SKIN:  No rashes are noted on limited exam.      LABORATORY DATA:  Sodium 137, potassium 4, chloride 101, bicarbonate 28, BUN 8, creatinine 1.1, glucose 96.  Lactic acid is 1.4.  White blood cell count 19.4, hemoglobin 16.5, platelets 240,000.  Hepatic panel is unremarkable.  Lipase 39.  Urinalysis shows 4 red cells.  Troponin is unremarkable.  CT of the abdomen shows the appendix in the upper limits of normal with no signs of inflammation or appendicitis.  Chest x-ray is unremarkable.      I reviewed the case with Dr. Ba from the Emergency Department.      ASSESSMENT AND PLAN:  Mr. Grigsby is a 36-year-old man who presents with abdominal pain, vomiting, fever and elevated white blood cell count.  He has also been having exertional chest pain over the past few months.   1.  Gastroenteritis.  He is still having a fair amount of discomfort.  It is reassuring that the CT scan did not show any structural abnormalities or other ominous findings.  This most likely is manifestations of a viral infection.  We will continue to treat with supportive cares with IV fluids, pain medications and antiemetics as needed.  He also will be treated with H2 blocker.  He will have his diet advanced as advanced as tolerated.   2.  Hematuria.  He does have some microscopic hematuria and this should be followed up as an outpatient.   3.  Exertional chest pain.  As mentioned above, he has been recently having chest discomfort and dyspnea with exertion and also has a strong family history of coronary disease.  He does not know his cholesterol status and we will add that on to the labs.  He should be scheduled for a stress test at some point, but if he is asymptomatic, it would be reasonable to do this shortly after discharge when his gastrointestinal problems have  completely resolved.         ORION PITTS MD             D: 2017 22:45   T: 2017 23:48   MT: [id number]      Name:     CANDY SOTO   MRN:      [id number]        Account:      [id number]   :      1980           Admitted:     [id number]      Document: [id number]       cc: [facility]

## 2017-06-23 NOTE — PLAN OF CARE
"Problem: Discharge Planning  Goal: Discharge Planning (Adult, OB, Behavioral, Peds)  PRIMARY DIAGNOSIS: GASTROENTERITIS     OUTPATIENT/OBSERVATION GOALS TO BE MET BEFORE DISCHARGE  1. Orthostatic performed: N/A     2. Tolerating PO fluid and/or antibiotics (if applicable): Yes     3. Nausea/Vomiting/Diarrhea symptoms improved: Yes     4. Pain status: Improved but still requiring IV narcotics.     5. Return to near baseline physical activity: No     Discharge Planner Nurse   Safe discharge environment identified: Yes  Barriers to discharge: No       Entered by: Rosie De La Rosa 06/23/2017 1:12 PM     A&O x 4.  Continues to have bilateral thigh tightness/pressure that radiates into buttocks and lower back.  Pt feels like \"my legs are going to explode.\"  Thighs appear swollen, but no edema noted.  Continues to have upper abdominal pain that radiates into epigastric/chest area and SOB.  PRN dilaudid given.  Denies nausea.  No diarrhea for 2 days per patient.  Temp recheck 99.9 and .  Sating 95% on RA, but desats into 80s with activity.  IS given and teach back performed.  Plan for BLE US and chest CT when radiology available.  Tele box requested.     Please review provider order for any additional goals.   Nurse to notify provider when observation goals have been met and patient is ready for discharge.      "

## 2017-06-23 NOTE — PHARMACY-ADMISSION MEDICATION HISTORY
Admission medication history interview status for this patient is complete. See [facility] admission navigator for allergy information, prior to admission medications and immunization status.     Medication history interview source(s):Patient  Medication history resources (including written lists, pill bottles, clinic record):None    Changes made to PTA medication list:  Added: none  Deleted: none  Changed: ASA to prn    Actions taken by pharmacist (provider contacted, etc):None     Additional medication history information:None    Medication reconciliation/reorder completed by provider prior to medication history? No    For patients on insulin therapy: no (Yes/No)   Lantus/levemir/NPH/Mix 70/30 dose: _____ in AM/PM or twice daily   Sliding scale Novolog Y/N   If Yes, do you have a baseline novolog pre-meal dose: ______units with meals   Patients eat three meals a day: Y/N   Any Barriers to therapy: cost of medications/comfortable with giving injections (if applicable)/ comfortable and confident with current diabetes regimen       Prior to Admission medications    Medication Sig Last Dose Taking? Auth Provider   aspirin 325 MG tablet Take 650 mg by mouth daily as needed  6/22/2017 at AM Yes Reported, Patient

## 2017-06-23 NOTE — PLAN OF CARE
"Problem: Goal Outcome Summary  Goal: Goal Outcome Summary  Outcome: Improving   A&OX4. Temp elevated. See below. Tylenol given X2. Complains of bilateral leg pain rated at 10/10. States legs feel heavy and swollen. Legs do not appear edematous but do feel firm. Complains of tingling in toes. MD aware, believes to be fluid overloaded. IV fluid rate decreased to 25 ml/hr. Chest CT shows bilateral pleural effusions. Patient also complains of mid to upper abdominal pain rated at 9/10. IV Dilaudid given X2. Denies nausea but states he's afraid that oral pain meds will upset his empty stomach. Poor appetite. No stools this shift. Ambulates with assist of one.        06/23/17 1739 06/23/17 2005 06/23/17 2102   Vital Signs   Temp 101.8  F (38.8  C) 101.1  F (38.4  C) 101.2  F (38.4  C)        1638: MD paged: \"Patient stating he's having new onset of tingling in toes. Legs don't appear edematous but are very firm feeling. Patient states he can't put his legs together. Can't lift legs off of bed.\"  1700: IV fluid rate order changed to 25 ml/hr.  2240: MD paged: Unable to get temp below 101 with tylenol all night. Can we try ibprofen?  "

## 2017-06-23 NOTE — PROGRESS NOTES
[facility]  Hospitalist Observation Unit Progress Note  Name: René Grigsby    MRN: [id number]  Provider:  Amy Jackson DO    Initial presenting complaint/issue to hospital (Diagnosis): fevers, abd pain, N/V/D.         Assessment and Plan:      Summary of Stay: René Grigsby is a 36 year old male admitted on 6/22/2017 with fevers on and off for 4 days associated with abd pain/cramping in the last 48hours with associated N/V/D     Problem List:   1.  Abd pain with N/V/D  Pain is described in the epigastric area and radiates to both sides of his rib cage  The pain is in the epigastric area and rad to the sides of rib cage.  History suspicious for a component of gastroenteritis with n/v/d and now abd pain/cramping  CT of the abd/pelvis without clear etiology of his pain--which is encouraging  Will add c diff and enteric stool panel  Will back off diet to low fiber    2.  Pleuritic and exertional CP  Will order d-dimer; if elevated (and probably will be) will proceed to CT of the chest to r/o PE, PNA, etc.  ? Component of pericarditis - will repeat EKG x 1 now (EKG reviewed from admission and only showed sinus tachy)  He was noted to have some pericardial effusion noted on CT of the abd  Will eval further with chest CT and/or ECHO    3.  Fevers and leukocytosis  Improved, unclear etiology  Plan, as above    4.  Leg swelling and pain  Will order leg dopplers this am to r/o DVT    5.  Dehydration  Continue IVF for now  Will monitor I/O's closely    DVT Prophylaxis:  -  lovenox   Code Status: Full Code  Discharge Dispo: home  Estimated Disch Date / # of Days until Discharge: anticipate 2 additional days and will transition to INPT status.    Observation unit physician is available until 1400.  After 1400, please contact the observation unit Physician Assistant if questions/concerns.        Interval History:      Still having intermittent abd pain/cramping.  Pain in epigastric area and radiates to both  "sides of his chest wall.    + pleuritic pain with deep respirations.  Fevers on and off for the last 4 days or so.  Urine still yellow and concentrated.  No emesis or diarrhea since OBS admission, but pain persists.  Reports cp and diaphoresis with exertion over the several weeks--improves with rest.  Legs \"tight and cramping\" today.  Has not been out of bed much since admit.  Ate a salad this am---does not think it made his symptoms worse.  No HA, + f/c, +myalgias.       Physical Exam:      Last Vital Signs:  Temp: 99.9  F (37.7  C) Temp src: Oral BP: 101/56 Pulse: 98 Heart Rate: 117 Resp: 16 SpO2: 95 % O2 Device: None (Room air)       GEN:  Alert, oriented x 3, comfortable, NAD.  HEENT:  Normocephalic/atraumatic, PERRL, no scleral icterus, no nasal discharge, mouth moist, no oral ulcers or thrush noted.  NECK:  No clear thyromegaly of clear JVD  CV:  Regular rate and rhythm, no murmur to ausc.  S1 + S2 noted, no S3 or S4.  LUNGS:  Clear to auscultation bilaterally.  No rales/rhonchi/wheezing auscultated bilaterally.  No costal retractions bilaterally.  Symmetric chest rise on inhalation noted.  ABD:  Active bowel sounds, soft, non-tender/non-distended.  No rebound/guarding/rigidity.  No masses palpated.  No obvious HSM to exam.  EXT:  No edema or cyanosis bilaterally. No joint synovitis noted.  No calf-tenderness or asymmetry noted.  SKIN:  Dry to touch, no rashes or jaundice noted.  PSYCH:  Mood appropriate, Not tearful or depressed.  Maintains direct eye contact.  NEURO:  No tremors at rest       Medications:      All current medications were reviewed.         Data:      All new lab and imaging data was reviewed.   Labs:    Recent Labs  Lab 06/22/17  1835      POTASSIUM 4.0   CHLORIDE 101   CO2 28   ANIONGAP 8   GLC 96   BUN 8   CR 1.14   GFRESTIMATED 73   GFRESTBLACK 88   YENNY 8.6       Recent Labs  Lab 06/23/17  1150 06/23/17  0230 06/22/17  1835   WBC 20.5* 15.9* 19.4*   HGB  --  16.3 16.5   HCT  --  " 49.4 50.3   MCV  --  85 84   PLT  --  207 240     No results for input(s): DD in the last 168 hours.  No results for input(s): INR in the last 168 hours.    Recent Labs  Lab 06/22/17  1835   TROPI <0.015The 99th percentile for upper reference range is 0.045 ug/L.  Troponin values in the range of 0.045 - 0.120 ug/L may be associated with risks of adverse clinical events.       Recent Labs  Lab 06/22/17  2025   COLOR Yellow   APPEARANCE Clear   URINEGLC Negative   URINEBILI Negative   URINEKETONE 20*   SG 1.010   UBLD Negative   URINEPH 6.0   PROTEIN Negative   NITRITE Negative   LEUKEST Negative   RBCU 4*   WBCU 1      Recent Imaging:   Recent Results (from the past 24 hour(s))   CT Abdomen Pelvis w Contrast    Narrative    CT ABDOMEN PELVIS WITH CONTRAST   6/22/2017 7:35 PM      HISTORY:  Left-sided abdominal pain for two days. Fever. Nausea and  vomiting.    TECHNIQUE:  CT abdomen and pelvis with intravenous contrast. Radiation  dose for this scan was reduced using automated exposure control,  adjustment of the mA and/or kV according to patient size, or iterative  reconstruction technique. 100mL Isovue-370     COMPARISON:  None.    FINDINGS:  Abdomen:  There are trace bilateral pleural effusions and there is  dependent atelectasis at the lung bases. Trace pericardial effusion.  The liver, spleen, gallbladder, pancreas, adrenal glands and kidneys  are normal in appearance. There is no abdominal or pelvic lymph node  enlargement.    Pelvis: There is no abdominal or pelvic lymph node enlargement. The  appendix is at the upper limits of normal. No surrounding  inflammation. No free intraperitoneal gas or fluid.      Impression    IMPRESSION:  1. Trace bilateral pleural effusions and trace pericardial effusion.  2. The appendix is at the upper limits of normal in size. No  surrounding inflammation and no convincing evidence of appendicitis.  3. No other acute abnormality. No bowel obstruction or inflammation.      JIM ESPINAL MD   Chest XR,  PA & LAT    Narrative    CHEST TWO VIEWS   6/22/2017 9:12 PM      HISTORY: Chest pain.     COMPARISON: None.    FINDINGS: The heart size is normal. The lungs are clear. No  pneumothorax or pleural effusion.      Impression    IMPRESSION:  No acute abnormality.     JIM ESPINAL MD

## 2017-06-23 NOTE — PLAN OF CARE
Problem: Discharge Planning  Goal: Discharge Planning (Adult, OB, Behavioral, Peds)  PRIMARY DIAGNOSIS: GASTROENTERITIS     OUTPATIENT/OBSERVATION GOALS TO BE MET BEFORE DISCHARGE  1. Orthostatic performed: N/A     2. Tolerating PO fluid and/or antibiotics (if applicable): Tolerating PO.  No Antibiotics ordered     3. Nausea/Vomiting/Diarrhea symptoms improved: Yes     4. Pain status: Still requiring IV narcotics.     5. Return to near baseline physical activity: Needs SBA     Discharge Planner Nurse   Safe discharge environment identified: Yes  Barriers to discharge: No       Entered by: Rosie De La Rosa 06/23/2017 0845      A&O x 4.  Up with SBA.  Complains of intermittent abdominal pain that radiates into epigastric/chest area.  When pain comes, it is 10/10.  PRN dilaudid given.  Complains of intermittent SOB.  Lungs clear.  Denies nausea.  Complains of bilateral thigh/hamstring tightness and stiffness.  HR this  and temp 99.9.  PA notified.  L thumb cut scabbed with no signs of infection.     Please review provider order for any additional goals.   Nurse to notify provider when observation goals have been met and patient is ready for discharge.

## 2017-06-23 NOTE — PROGRESS NOTES
CM: Called to Financial Counseling to make aware of no insurance.      Will continue to follow for any needs prior to discharge.    Gricelda Mello, RN, BSN, PHN, CTS  Care Transitions Specialist  [facility]  [phone]

## 2017-06-24 ENCOUNTER — APPOINTMENT (OUTPATIENT)
Dept: GENERAL RADIOLOGY | Facility: CLINIC | Age: 37
End: 2017-06-24
Attending: PSYCHIATRY & NEUROLOGY
Payer: MEDICAID

## 2017-06-24 ENCOUNTER — APPOINTMENT (OUTPATIENT)
Dept: MRI IMAGING | Facility: CLINIC | Age: 37
End: 2017-06-24
Attending: PSYCHIATRY & NEUROLOGY
Payer: MEDICAID

## 2017-06-24 ENCOUNTER — APPOINTMENT (OUTPATIENT)
Dept: CARDIOLOGY | Facility: CLINIC | Age: 37
End: 2017-06-24
Attending: INTERNAL MEDICINE
Payer: MEDICAID

## 2017-06-24 LAB
ANION GAP SERPL CALCULATED.3IONS-SCNC: 8 MMOL/L (ref 3–14)
APPEARANCE CSF: ABNORMAL
BASOPHILS # BLD AUTO: 0 10E9/L (ref 0–0.2)
BASOPHILS NFR BLD AUTO: 0.2 %
BUN SERPL-MCNC: 10 MG/DL (ref 7–30)
CALCIUM SERPL-MCNC: 7.6 MG/DL (ref 8.5–10.1)
CHLORIDE SERPL-SCNC: 103 MMOL/L (ref 94–109)
CK SERPL-CCNC: 145 U/L (ref 30–300)
CO2 SERPL-SCNC: 24 MMOL/L (ref 20–32)
COLOR CSF: ABNORMAL
CREAT SERPL-MCNC: 1.1 MG/DL (ref 0.66–1.25)
DIFFERENTIAL METHOD BLD: ABNORMAL
EOSINOPHIL # BLD AUTO: 3 10E9/L (ref 0–0.7)
EOSINOPHIL NFR BLD AUTO: 13.9 %
ERYTHROCYTE [DISTWIDTH] IN BLOOD BY AUTOMATED COUNT: 13.6 % (ref 10–15)
GFR SERPL CREATININE-BSD FRML MDRD: 76 ML/MIN/1.7M2
GLUCOSE CSF-MCNC: 66 MG/DL (ref 40–70)
GLUCOSE SERPL-MCNC: 99 MG/DL (ref 70–99)
GRAM STN SPEC: NORMAL
HCT VFR BLD AUTO: 52.2 % (ref 40–53)
HGB BLD-MCNC: 17.3 G/DL (ref 13.3–17.7)
IMM GRANULOCYTES # BLD: 0.3 10E9/L (ref 0–0.4)
IMM GRANULOCYTES NFR BLD: 1.6 %
LACTATE BLD-SCNC: 1.5 MMOL/L (ref 0.7–2.1)
LYMPHOCYTES # BLD AUTO: 0.7 10E9/L (ref 0.8–5.3)
LYMPHOCYTES NFR BLD AUTO: 3.4 %
MCH RBC QN AUTO: 28 PG (ref 26.5–33)
MCHC RBC AUTO-ENTMCNC: 33.1 G/DL (ref 31.5–36.5)
MCV RBC AUTO: 85 FL (ref 78–100)
MICRO REPORT STATUS: NORMAL
MONOCYTES # BLD AUTO: 0.4 10E9/L (ref 0–1.3)
MONOCYTES NFR BLD AUTO: 2 %
NEUTROPHILS # BLD AUTO: 17 10E9/L (ref 1.6–8.3)
NEUTROPHILS NFR BLD AUTO: 78.9 %
NRBC # BLD AUTO: 0 10*3/UL
NRBC BLD AUTO-RTO: 0 /100
PLATELET # BLD AUTO: 254 10E9/L (ref 150–450)
POTASSIUM SERPL-SCNC: 4.4 MMOL/L (ref 3.4–5.3)
PROT CSF-MCNC: 35 MG/DL (ref 15–60)
RBC # BLD AUTO: 6.17 10E12/L (ref 4.4–5.9)
RBC # CSF MANUAL: 610 /UL (ref 0–2)
RETICS # AUTO: 88.6 10E9/L (ref 25–95)
RETICS/RBC NFR AUTO: 1.4 % (ref 0.5–2)
SODIUM SERPL-SCNC: 135 MMOL/L (ref 133–144)
SPECIMEN SOURCE: NORMAL
T4 FREE SERPL-MCNC: 0.9 NG/DL (ref 0.76–1.46)
TROPONIN I SERPL-MCNC: NORMAL UG/L (ref 0–0.04)
TSH SERPL DL<=0.005 MIU/L-ACNC: 4.19 MU/L (ref 0.4–4)
TUBE # CSF: 4 #
WBC # BLD AUTO: 21.5 10E9/L (ref 4–11)
WBC # CSF MANUAL: 5 /UL (ref 0–5)

## 2017-06-24 PROCEDURE — 25000132 ZZH RX MED GY IP 250 OP 250 PS 637: Performed by: PSYCHIATRY & NEUROLOGY

## 2017-06-24 PROCEDURE — 84443 ASSAY THYROID STIM HORMONE: CPT | Performed by: INTERNAL MEDICINE

## 2017-06-24 PROCEDURE — 86617 LYME DISEASE ANTIBODY: CPT | Performed by: PSYCHIATRY & NEUROLOGY

## 2017-06-24 PROCEDURE — 89050 BODY FLUID CELL COUNT: CPT | Performed by: PSYCHIATRY & NEUROLOGY

## 2017-06-24 PROCEDURE — 82085 ASSAY OF ALDOLASE: CPT | Performed by: INTERNAL MEDICINE

## 2017-06-24 PROCEDURE — 25000128 H RX IP 250 OP 636: Performed by: INTERNAL MEDICINE

## 2017-06-24 PROCEDURE — 80048 BASIC METABOLIC PNL TOTAL CA: CPT | Performed by: INTERNAL MEDICINE

## 2017-06-24 PROCEDURE — 009U3ZX DRAINAGE OF SPINAL CANAL, PERCUTANEOUS APPROACH, DIAGNOSTIC: ICD-10-PCS | Performed by: RADIOLOGY

## 2017-06-24 PROCEDURE — 99207 ZZC CDG-MDM COMPONENT: MEETS MODERATE - DOWN CODED: CPT | Performed by: INTERNAL MEDICINE

## 2017-06-24 PROCEDURE — 40000847 ZZHCL STATISTIC MORPHOLOGY W/INTERP HISTOLOGY TC 85060: Performed by: INTERNAL MEDICINE

## 2017-06-24 PROCEDURE — 72148 MRI LUMBAR SPINE W/O DYE: CPT

## 2017-06-24 PROCEDURE — 82550 ASSAY OF CK (CPK): CPT | Performed by: INTERNAL MEDICINE

## 2017-06-24 PROCEDURE — 27210202 XR LUMBAR PUNCTURE SPINAL TAP DIAGNOSTIC

## 2017-06-24 PROCEDURE — 36415 COLL VENOUS BLD VENIPUNCTURE: CPT | Performed by: INTERNAL MEDICINE

## 2017-06-24 PROCEDURE — 25000132 ZZH RX MED GY IP 250 OP 250 PS 637: Performed by: INTERNAL MEDICINE

## 2017-06-24 PROCEDURE — 85045 AUTOMATED RETICULOCYTE COUNT: CPT | Performed by: INTERNAL MEDICINE

## 2017-06-24 PROCEDURE — 82945 GLUCOSE OTHER FLUID: CPT | Performed by: PSYCHIATRY & NEUROLOGY

## 2017-06-24 PROCEDURE — 84157 ASSAY OF PROTEIN OTHER: CPT | Performed by: PSYCHIATRY & NEUROLOGY

## 2017-06-24 PROCEDURE — 25000128 H RX IP 250 OP 636: Performed by: PSYCHIATRY & NEUROLOGY

## 2017-06-24 PROCEDURE — 83605 ASSAY OF LACTIC ACID: CPT | Performed by: INTERNAL MEDICINE

## 2017-06-24 PROCEDURE — 12000007 ZZH R&B INTERMEDIATE

## 2017-06-24 PROCEDURE — 87070 CULTURE OTHR SPECIMN AEROBIC: CPT | Performed by: PSYCHIATRY & NEUROLOGY

## 2017-06-24 PROCEDURE — 93306 TTE W/DOPPLER COMPLETE: CPT

## 2017-06-24 PROCEDURE — 87040 BLOOD CULTURE FOR BACTERIA: CPT | Performed by: INTERNAL MEDICINE

## 2017-06-24 PROCEDURE — 86788 WEST NILE VIRUS AB IGM: CPT | Performed by: PSYCHIATRY & NEUROLOGY

## 2017-06-24 PROCEDURE — 86789 WEST NILE VIRUS ANTIBODY: CPT | Performed by: PSYCHIATRY & NEUROLOGY

## 2017-06-24 PROCEDURE — 93306 TTE W/DOPPLER COMPLETE: CPT | Mod: 26 | Performed by: INTERNAL MEDICINE

## 2017-06-24 PROCEDURE — 85025 COMPLETE CBC W/AUTO DIFF WBC: CPT | Performed by: INTERNAL MEDICINE

## 2017-06-24 PROCEDURE — 84484 ASSAY OF TROPONIN QUANT: CPT | Performed by: INTERNAL MEDICINE

## 2017-06-24 PROCEDURE — 94150 VITAL CAPACITY TEST: CPT

## 2017-06-24 PROCEDURE — 87205 SMEAR GRAM STAIN: CPT | Performed by: PSYCHIATRY & NEUROLOGY

## 2017-06-24 PROCEDURE — 99232 SBSQ HOSP IP/OBS MODERATE 35: CPT | Performed by: INTERNAL MEDICINE

## 2017-06-24 PROCEDURE — 85060 BLOOD SMEAR INTERPRETATION: CPT | Performed by: INTERNAL MEDICINE

## 2017-06-24 PROCEDURE — 84439 ASSAY OF FREE THYROXINE: CPT | Performed by: INTERNAL MEDICINE

## 2017-06-24 RX ORDER — IBUPROFEN 600 MG/1
600 TABLET, FILM COATED ORAL 3 TIMES DAILY PRN
Status: DISCONTINUED | OUTPATIENT
Start: 2017-06-24 | End: 2017-07-01 | Stop reason: HOSPADM

## 2017-06-24 RX ORDER — SODIUM CHLORIDE 9 MG/ML
100 INJECTION, SOLUTION INTRAVENOUS CONTINUOUS
Status: ACTIVE | OUTPATIENT
Start: 2017-06-24 | End: 2017-06-25

## 2017-06-24 RX ORDER — DIPHENHYDRAMINE HCL 25 MG
25 CAPSULE ORAL EVERY 6 HOURS PRN
Status: DISCONTINUED | OUTPATIENT
Start: 2017-06-24 | End: 2017-07-01 | Stop reason: HOSPADM

## 2017-06-24 RX ADMIN — HYDROMORPHONE HYDROCHLORIDE 0.5 MG: 1 INJECTION, SOLUTION INTRAMUSCULAR; INTRAVENOUS; SUBCUTANEOUS at 16:13

## 2017-06-24 RX ADMIN — RANITIDINE 150 MG: 150 TABLET ORAL at 07:52

## 2017-06-24 RX ADMIN — DIPHENHYDRAMINE HYDROCHLORIDE 25 MG: 25 CAPSULE ORAL at 18:37

## 2017-06-24 RX ADMIN — ASPIRIN 325 MG: 325 TABLET, DELAYED RELEASE ORAL at 22:44

## 2017-06-24 RX ADMIN — RANITIDINE 150 MG: 150 TABLET ORAL at 20:11

## 2017-06-24 RX ADMIN — ACETAMINOPHEN 650 MG: 325 TABLET, FILM COATED ORAL at 16:13

## 2017-06-24 RX ADMIN — IBUPROFEN 600 MG: 600 TABLET ORAL at 20:11

## 2017-06-24 RX ADMIN — HUMAN IMMUNOGLOBULIN G 85 G: 40 LIQUID INTRAVENOUS at 19:44

## 2017-06-24 RX ADMIN — HYDROMORPHONE HYDROCHLORIDE 0.5 MG: 1 INJECTION, SOLUTION INTRAMUSCULAR; INTRAVENOUS; SUBCUTANEOUS at 00:41

## 2017-06-24 RX ADMIN — OXYCODONE HYDROCHLORIDE 5 MG: 5 TABLET ORAL at 07:52

## 2017-06-24 RX ADMIN — ACETAMINOPHEN 650 MG: 325 TABLET, FILM COATED ORAL at 21:14

## 2017-06-24 RX ADMIN — OXYCODONE HYDROCHLORIDE 5 MG: 5 TABLET ORAL at 09:32

## 2017-06-24 NOTE — CONSULTS
GASTROINTESTINAL CONSULTATION       PRIMARY CARE PHYSICIAN:  [facility] in [place].       HISTORY OF PRESENT ILLNESS:  René Grigsby is a 36-year-old -American male who started feeling sick on Tuesday.  He felt a little bit of abdominal discomfort when he was riding his bicycle to work and that evening he had a pain in his abdomen, generally not on one side or the other.  The patient woke up Thursday morning and felt quite a bit worse as far as his pain in his abdomen was concerned, so he was brought in to the emergency room.  He said that he had been vomiting, but he was not bringing up any blood or coffee ground emesis and he had no diarrhea and he had no blood in his stools.  In fact, he had no bowel movements for a few days before hand.  The only medications the patient has been taking is aspirin 325 mg a day.  The patient has a strong family history of heart disease.  He was a smoker time, but he quit 3 years ago.      PHYSICAL EXAMINATION:  Examination now shows that his abdomen is slightly tender in the left upper quadrant, but there are no palpable masses and no tenderness elsewhere in his abdomen.  He has active bowel sounds.  He does point to pain in his quadriceps area and he says there is swelling there compared to the usual size of his thighs and he also feels very febrile to touch.      LABORATORY DATA:  The laboratory data is reviewed and shows a high white count with normal hemoglobin, normal lipase, normal liver chemistries.  CT of the abdomen shows no abnormalities in the abdomen.  He did have an MRI and a spinal tap today and those results are pending.      ASSESSMENT:  At this point in time is that this patient probably has some type of a viral syndrome that is presenting with several different body systems.  Specifically, the neurological and GI tract, but there is nothing focal there and most likely this will not necessitate any invasive procedures.  I will just follow him  with you and see how his clinical course unfolds.      Thank you for allowing me to see this patient in consultation.         VEL BURNS MD             D: 2017 17:00   T: 2017 17:23   MT: [id number]      Name:     CANDY SOTO   MRN:      [id number]        Account:       [id number]   :      1980           Consult Date:  2017      Document: [id number]       cc: [facility]

## 2017-06-24 NOTE — PROGRESS NOTES
Fluoro guided lumbar puncture  Patient tolerated procedure well  EBL: none  Specimen: 10ml CSF in 4 separate tubes submitted to lab

## 2017-06-24 NOTE — CONSULTS
Infectious Diseases Consultation  June 24, 2017  René Grigsby MRN# [id number]   Age: 36 year old YOB: 1980   Date of Admission: 6/22/2017     Reason for consult: I was asked to see this patient for evaluation of fevers, abdominal pain          Requesting physician Terrence              Past Medical History:  Past Medical History:   Diagnosis Date     Chronic kidney disease        Past Surgical History:  History reviewed. No pertinent surgical history.    History of Present Illness:36-year-old man who presents with 2 days of upper abdominal pain.  He is also having some fevers and nausea.  He vomited 3 times over the past couple of days.  He has been able to keep down some liquids, otherwise has not eaten much.  He does not have any history of gastrointestinal problems and has had no known sick contacts.  He has had no dysuria and no diarrhea.  He has had no bowel movements for the past couple of days.   2 weeks ago playing baseball SOB and chest discomfort, got better with rest  Similar SOB and chest/upper abd. Pain with exertion  Fevers onset this week  Low back pain  Has gained about 35 # just in past week, legs have rapidly gotten very big  No cough  No diarrhea  No  sxs  Breathing better sitting up and forward  Pleuritic pain in L chest and upper abdomen  No ill contacts  No travel  Works as   No URI sxs, no ST  14 pt ROS o/w neg  Antibiotics:  None    Tm 102.8 today and 102.3 6/23    Review of Systems: as per HPI    Social History: , 2 children, works as , lives in [place], no smoke or etoh  Social History   Substance Use Topics     Smoking status: Former Smoker     Smokeless tobacco: Never Used     Alcohol use No        Family History:  No family history on file.     Allergies:  This patient is allergic to has No Known Allergies.     Physical Exam:  Vitals were reviewed  Blood pressure 106/67, pulse 86, temperature 101.3  F (38.5  C), temperature source Oral, resp. rate 20,  "height 1.753 m (5' 9\"), weight 109.4 kg (241 lb 3.2 oz), SpO2 93 %.  GEN:  Alert, comfortable, non toxic  HEENT: no LA   Oral clear  LUNG: CTA anteriorly, not able to sit up  COR: RRR, no murmur or rub  BACK ? Tender to palpation L spine  ABDOMEN: soft, mildly tender in epigastrium, ND  EXT: legs 2+ edema at least  No rash  No stig IE  No effusion  Neuro:  Motor arms 5/5, legs DF,PF 5/5, HF-can lift off bed but with difficulty       Data:  CBC  Recent Labs  Lab 06/24/17  0703  06/23/17  0230 06/22/17  1835   WBC 21.5*  < > 15.9* 19.4*   HGB 17.3  --  16.3 16.5     --  207 240   < > = values in this interval not displayed.    BMP  Recent Labs  Lab 06/24/17  0703 06/23/17  1150 06/22/17  1835    136 137   POTASSIUM 4.4 4.7 4.0   CHLORIDE 103 103 101   YENNY 7.6* 7.7* 8.6   CO2 24 27 28   BUN 10 8 8   CR 1.10 1.27* 1.14   GLC 99 119* 96       CULTURES  Recent Labs  Lab 06/22/17  1910 06/22/17  1835   CULT No growth after 2 days No growth after 2 days   CSF 5 WBC protein 35 Glucose 66  BC 6/24 NG x 2    MRI L spine-  FINDINGS: Five lumbar vertebrae are assumed. Vertebral body heights  and sagittal alignment are within normal limits. The conus medullaris  is unremarkable in appearance on the sagittal images. Disc signal and  disc heights are within normal limits throughout the lumbar spine.  Apophyseal joints appear within normal limits and no periarticular  reactive marrow edema. Transverse images from the inferior aspect of  the L1 vertebral body to the top of the sacrum reveal no evidence of  lumbar disc bulge or herniation. There is no central or foraminal  stenosis.         IMPRESSION: No lumbar degenerative loss of disc signal or disc  herniation. No stenosis or apparent neural impingement.    CT chest-  FINDINGS:  No evidence for pulmonary embolism. The thoracic aorta is  of normal caliber, without evidence for thoracic aortic aneurysm or  dissection. There are small bilateral pleural effusions. " No  pericardial effusion. No enlarged lymph nodes are identified in the  chest. Mild atelectasis at both lung bases posteriorly. The lungs are  otherwise clear. Limited views of the upper abdomen are unremarkable.  No aggressive-appearing bone lesions.          IMPRESSION:   1. No evidence for pulmonary embolism or thoracic aortic dissection.    2. Small bilateral pleural effusions    TTE-  Left ventricular systolic function is normal.  No regional wall motion abnormalities noted.  Normal cardiac valves. Mild to Mild-moderate RV dilatation noted.  _____________________________________________________________________________  __        Left Ventricle  The left ventricle is normal in size. There is normal left ventricular wall  thickness. Left ventricular systolic function is normal. The visual ejection  fraction is estimated at 60-65%. Left ventricular diastolic function is  indeterminate. E by E prime ratio is between 8 and 15, which is indeterminate  for assessment of left ventricular filling pressures. No regional wall motion  abnormalities noted.     Right Ventricle  The right ventricle is mild to moderately dilated. The right ventricular  systolic function is normal.     Atria  Normal left atrial size. Right atrial size is normal. Likely prominent  Eustachian valve/Chiari network noted. A patent foramen ovale is suspected.     Mitral Valve  The mitral valve leaflets appear normal. There is no evidence of stenosis,  fluttering, or prolapse. There is physiologic mitral regurgitation.        Tricuspid Valve  The tricuspid valve is not well visualized, but is grossly normal. There is  trace to mild tricuspid regurgitation. The right ventricular systolic pressure  is approximated at 13.1 mmHg plus the right atrial pressure. Right ventricular  systolic pressure could be underestimated due to incomplete tricuspid  regurgitation velocity envelope. Normal IVC (1.5-2.5cm) with >50% respiratory  collapse; right atrial  pressure is estimated at 5-10mmHg.     Aortic Valve  The aortic valve is trileaflet. No aortic regurgitation is present. No aortic  stenosis is present.     Pulmonic Valve  The pulmonic valve is not well seen, but is grossly normal. There is no  pulmonic valvular regurgitation. Normal pulmonic valve velocity.     Vessels  The aortic root is normal size. Normal size ascending aorta. The IVC is normal  in size and reactivity with respiration, suggesting normal central venous  pressure.     Pericardium  Trivial pericardial effusion.      Attestation:  I have reviewed today's vital signs, notes, medications, labs and imaging.    ASSESSMENT:  1. FEVERS-unclear etiology, ? Spine infection but MRI L spine neg, BC neg, ? Myocarditis but CK normal and LV EF normal, ? Pericarditis, trivial fluid around pericardium,  procalcitonin normal  2. ABDOMINAL and CHEST PAIN-pleuritic, ? Cardiac, TTE ok but moderate RV dilation-? Related to edema, wt gain  3. LEUKOCYTOSIS-ID w/u so far negative but still concern ID process  4. EDEMA, WT GAIN-? RV failure, ? Pulmonary HTN, would ask cards to asses      RECOMMENDATION  1. BC x 2 again  2. Defer antibiotics  3. Cards consult, ? Other w/u edema, RV dilation  4. ? Need to MRI T spine    Thanks for asking me to see him!    SELENA BOYER M.D.  O:[phone]   B:[phone]

## 2017-06-24 NOTE — PLAN OF CARE
Problem: Goal Outcome Summary  Goal: Goal Outcome Summary  Outcome: No Change  Resumed cares @ 0420, no fever overnight. IVF infusing at 25 mL/hr. CT and US negative. Sleeping in between cares. Will cont with POC.

## 2017-06-24 NOTE — CONSULTS
Neurology Consult Note  [facility]       [2017]                                                                                       Admission Date: 2017  Hospital Day: 3      Patient: René Grigsby      : 1980  MRN:  [id number]     CC:      Chief Complaint   Patient presents with     Abdominal Pain     Back Pain       Consult Request:  Referring Provider:  Amy Jackson DO  Primary Care Provider:  Clinic, [facility] MD        HPI:  René Grigsby is a 36 year old yo male admitted for abdominal pain for last 4 days. He c/o increased shortness of breath and palpitations with activity for last 10 days. He started noticing abdominal pain and weakness in legs last Tuesday. He also c/o severe low back pain. He has noted tingling in his toes. He denies any double vision or difficulty swallowing.  No h/o diarrhea, foreign travel or significant upper respiratory tract infections.          A complete review of symptoms was performed including vascular, infectious, cardiovascular, pulmonary, gastrointestinal, endocrinological, hematologic, dermatologic, musculoskeletal, and neurological. All were normal except as above.    PAST MEDICAL HISTORY:  ALLERGIES: No Known Allergies  Tobacco:    History   Smoking Status     Former Smoker   Smokeless Tobacco     Never Used     Alcohol:    Alcohol use No     MEDICATIONS:       CURRENTLY SCHEDULED MEDICATIONS     sodium chloride (PF)  3 mL Intracatheter Q8H     ranitidine  150 mg Oral BID          HOME MEDICATIONS  Prescriptions Prior to Admission   Medication Sig Dispense Refill Last Dose     aspirin 325 MG tablet Take 650 mg by mouth daily as needed    2017 at AM     MEDICAL HISTORY  Past Medical History:   Diagnosis Date     Chronic kidney disease      SURGICAL HISTORY  History reviewed. No pertinent surgical history.  FAMILY HISTORY    No family history on file.  SOCIAL HISTORY  Social History  "    Social History     Marital status: Single     Spouse name: N/A     Number of children: N/A     Years of education: N/A     Social History Main Topics     Smoking status: Former Smoker     Smokeless tobacco: Never Used     Alcohol use No     Drug use: No     Sexual activity: Not Asked     Other Topics Concern     None     Social History Narrative     None            Height: 175.3 cm (5' 9\")     Temp: 99.8  F (37.7  C)   Weight: 109.4 kg (241 lb 3.2 oz)    Temp src: Oral         BP: 116/64   Heart Rate: 111     Estimated body mass index is 35.62 kg/(m^2) as calculated from the following:    Height as of this encounter: 1.753 m (5' 9\").    Weight as of this encounter: 109.4 kg (241 lb 3.2 oz).    Resp: 16   SpO2: 97 %   O2 Device: None (Room air)     Blood Pressure:   BP Readings from Last 3 Encounters:   17 116/64     T24 : Temp (24hrs), Av.4  F (38  C), Min:97  F (36.1  C), Max:103  F (39.4  C)       GENERAL EXAMINATION:  HEENT: PERRLA, EOMI.  Neck: Supple, No carotid bruits. No myofascial tender points.  Chest: Bilateral air entry present.  Heart: S1, S2 RRR.    NEUROLOGICAL EXAMINATION  Mental Status:  Patient is awake, alert, and oriented X3. Speech and language functions are normal. Short- and long-term memory are intact.      Cranial Nerves: Pupils are equal and reactive to light.Visual fields are full to confrontation. Extraocular movements are intact. There is no nystagmus in the horizontal or vertical planes.No facial sensory deficits. No facial weakness. The tongue is midline.     Motor: Muscle tone is normal. There is no pronator drift. There is no muscle atrophy. The strength is 5/5 in upper and distal lower extremities bilaterally. He has 4-/5 strength in hip flexors. Deep tendon reflexes are 2+ and symmetric in his biceps, triceps, knees, and ankles. Plantars are flexor. .     Sensory: Patient has normal pin prick and light touch sensation in the upper and lower extremities. "     Coordination: .Finger to nose  testing - normal.    Gait: not evaluated. He c/o difficulty in getting up and standing on his own.    .  LABORATORY RESULTS        Recent Labs  Lab 06/24/17  0703 06/23/17  1150 06/23/17  0230 06/22/17  1835   WBC 21.5* 20.5* 15.9* 19.4*   HGB 17.3  --  16.3 16.5   HCT 52.2  --  49.4 50.3   MCV 85  --  85 84     --  207 240          Lab Results   Component Value Date     06/24/2017     06/23/2017     06/22/2017    Lab Results   Component Value Date    CHLORIDE 103 06/24/2017    CHLORIDE 103 06/23/2017    CHLORIDE 101 06/22/2017    Lab Results   Component Value Date    BUN 10 06/24/2017    BUN 8 06/23/2017    BUN 8 06/22/2017      Lab Results   Component Value Date    POTASSIUM 4.4 06/24/2017    POTASSIUM 4.7 06/23/2017    POTASSIUM 4.0 06/22/2017    Lab Results   Component Value Date    CO2 24 06/24/2017    CO2 27 06/23/2017    CO2 28 06/22/2017    Lab Results   Component Value Date    CR 1.10 06/24/2017    CR 1.27 06/23/2017    CR 1.14 06/22/2017          Recent Labs  Lab 06/24/17  1004 06/23/17  1150    98       IMAGING RESULTS     Recent Results (from the past 24 hour(s))   US Lower Extremity Venous Duplex Bilateral    Narrative    ULTRASOUND  LOWER EXTREMITY VENOUS DUPLEX BILATERAL  6/23/2017 1:48 PM      HISTORY: Leg pain, swelling.     FINDINGS: The deep veins in the right and left lower extremity are  compressible throughout. The deep veins demonstrate normal venous  augmentation, waveforms and color Doppler flow. No evidence of  superficial thrombophlebitis.      Impression    IMPRESSION: No evidence of DVT.    EMIL WADE MD   CT Chest Pulmonary Embolism w Contrast    Narrative    CT CHEST PULMONARY EMBOLISM WITH CONTRAST  6/23/2017 2:23 PM    HISTORY: Chest pain. Fever.    TECHNIQUE: Pulmonary embolism protocol was performed. 85 mL Isovue-370  were injected IV. After contrast injection, volumetric helical  acquisition was performed from the  thoracic inlet through the upper  abdomen. Radiation dose for this scan was reduced using automated  exposure control, adjustment of the mA and/or kV according to patient  size, or iterative reconstruction technique.    COMPARISON: None.    FINDINGS:  No evidence for pulmonary embolism. The thoracic aorta is  of normal caliber, without evidence for thoracic aortic aneurysm or  dissection. There are small bilateral pleural effusions. No  pericardial effusion. No enlarged lymph nodes are identified in the  chest. Mild atelectasis at both lung bases posteriorly. The lungs are  otherwise clear. Limited views of the upper abdomen are unremarkable.  No aggressive-appearing bone lesions.       Impression    IMPRESSION:   1. No evidence for pulmonary embolism or thoracic aortic dissection.    2. Small bilateral pleural effusions.      BRAD HENNESSY MD       _____________________________________________________________________________    Cardiac echo:  pending  _____________________________________________________________________________          ASSESSMENT     1. Weakness in legs with tingling in feet and shortness of breath- Suspect AIDP.        RECOMMENDATIONS   1. Spinal tap  2. MRI lumbar spine with nini- look for inflammatory polyradiculopathy  3. Check vital capacity and NIF q 12h. Observe BP fluctuations and respiratory status.  4. I will consider starting him on IVIG after the spinal tap

## 2017-06-24 NOTE — PROGRESS NOTES
[facility]  Hospitalist Progress Note  Name: René Grigsby    MRN: [id number]  Provider:  Enrrique Ocampo MD  06/24/17    Initial presenting complaint/issue to hospital (Diagnosis): fever, abdominal pain.         Assessment and Plan:      Summary of Stay: René Grigsby is a 36 year old male admitted on 6/22/2017 with fever and abdominal pain. Pt states since Tuesday he has been having sharp abdominal pain that comes and goes with some nausea. No emesis or loose stools. Also has had fevers and weakness of LE's.      Problem List:     1. Fever.  - Etiology not determined.  - Cx NGTD.  - Doubt infectious/bacterial etiology.  - Has elevated WCC with some eosinophilia.  - CT A/P showed small pericardial effusion, will get TTE.    2. Abdominal Pain.  - Initially epigastric, now diffuse. No emesis/loose stools.  - CT A/P did not show any acute process, will get GI consult to assist.    3. Weakness of LE's.  - This is new, typically is very active.  - Has good upper extremity strength.  - Will get neurology to eval.    DVT Prophylaxis:  -  PCD's.  Code Status: Full Code  Discharge Dispo: TBD.  Estimated Disch Date / # of Days until Discharge: > 2 days.        Interval History:        + fever overnight. + abdominal pain which is diffuse. Some B LE weakness.                  Physical Exam:      Last Vital Signs:  Temp: 97  F (36.1  C) Temp src: Oral BP: 105/64 Pulse: 86 Heart Rate: 106 Resp: 16 SpO2: 97 % O2 Device: None (Room air)      Intake/Output Summary (Last 24 hours) at 06/24/17 0844  Last data filed at 06/24/17 0759   Gross per 24 hour   Intake              240 ml   Output                0 ml   Net              240 ml        Vitals:    06/22/17 1805 06/22/17 2314 06/23/17 0946 06/24/17 0532   Weight: 102.1 kg (225 lb) 103.9 kg (229 lb) 105.6 kg (232 lb 12.8 oz) 109.4 kg (241 lb 3.2 oz)       Gen - AAO x 3 in NAD.  Lungs - CTA B.  Heart - RR,S1+S2 nml, no m/g/r.  Abd - soft, diffuse ttp, ND, + BS.  Ext -  trace edema, no ttp.  Neuro - CN II-XII wnl, + proximal weakness of B LE's, sensation wnl, unable to elicit patella reflexes B.  Skin - no rash.         Medications:      All current medications were reviewed.         Data:      All new lab and imaging data was reviewed.   Labs:    Recent Labs  Lab 06/22/17  1910 06/22/17  1835   CULT No growth after 2 days No growth after 2 days       Recent Labs  Lab 06/24/17  0703 06/23/17  1150 06/23/17  0230 06/22/17  1835   WBC 21.5* 20.5* 15.9* 19.4*   HGB 17.3  --  16.3 16.5   HCT 52.2  --  49.4 50.3   MCV 85  --  85 84     --  207 240       Recent Labs  Lab 06/24/17  0703 06/23/17  1150 06/22/17  1835    136 137   POTASSIUM 4.4 4.7 4.0   CHLORIDE 103 103 101   CO2 24 27 28   ANIONGAP 8 6 8   GLC 99 119* 96   BUN 10 8 8   CR 1.10 1.27* 1.14   GFRESTIMATED 76 64 73   GFRESTBLACK >90African American GFR Calc 78 88   YENNY 7.6* 7.7* 8.6   PROTTOTAL  --  5.5* 6.6*   ALBUMIN  --  2.3* 3.4   BILITOTAL  --  0.6 0.8   ALKPHOS  --  40 46   AST  --  8 11   ALT  --  13 20     No results for input(s): INR in the last 168 hours.    Recent Labs  Lab 06/23/17  1150 06/22/17  1835   LIPASE 34* 39*       Recent Labs  Lab 06/22/17  1835   TROPI <0.015The 99th percentile for upper reference range is 0.045 ug/L.  Troponin values in the range of 0.045 - 0.120 ug/L may be associated with risks of adverse clinical events.      Recent Imaging:   Recent Results (from the past 24 hour(s))   US Lower Extremity Venous Duplex Bilateral    Narrative    ULTRASOUND  LOWER EXTREMITY VENOUS DUPLEX BILATERAL  6/23/2017 1:48 PM      HISTORY: Leg pain, swelling.     FINDINGS: The deep veins in the right and left lower extremity are  compressible throughout. The deep veins demonstrate normal venous  augmentation, waveforms and color Doppler flow. No evidence of  superficial thrombophlebitis.      Impression    IMPRESSION: No evidence of DVT.    EMIL WADE MD   CT Chest Pulmonary Embolism w Contrast     Narrative    CT CHEST PULMONARY EMBOLISM WITH CONTRAST  6/23/2017 2:23 PM    HISTORY: Chest pain. Fever.    TECHNIQUE: Pulmonary embolism protocol was performed. 85 mL Isovue-370  were injected IV. After contrast injection, volumetric helical  acquisition was performed from the thoracic inlet through the upper  abdomen. Radiation dose for this scan was reduced using automated  exposure control, adjustment of the mA and/or kV according to patient  size, or iterative reconstruction technique.    COMPARISON: None.    FINDINGS:  No evidence for pulmonary embolism. The thoracic aorta is  of normal caliber, without evidence for thoracic aortic aneurysm or  dissection. There are small bilateral pleural effusions. No  pericardial effusion. No enlarged lymph nodes are identified in the  chest. Mild atelectasis at both lung bases posteriorly. The lungs are  otherwise clear. Limited views of the upper abdomen are unremarkable.  No aggressive-appearing bone lesions.       Impression    IMPRESSION:   1. No evidence for pulmonary embolism or thoracic aortic dissection.    2. Small bilateral pleural effusions.      BRAD HENNESSY MD

## 2017-06-24 NOTE — PLAN OF CARE
Problem: Goal Outcome Summary  Goal: Goal Outcome Summary  Outcome: No Change     Low grade temp, max 99.8, HR elevated, BP Ok. Pt c/o chest/leg discomfort rec'd 10mg PRN Oxycodone. Caused HA, declined further intervention. Neuro, ID, GI consulted. Lumbar/MRI ordered, resulted pending. Up BR with heavy A1 with gait belt, very weak (at hips per neurology). Unable to void, bladder scan with little results (84ml). A/Ox4, alarms on for safety. Echo today, see results. Tele ST.

## 2017-06-25 ENCOUNTER — APPOINTMENT (OUTPATIENT)
Dept: MRI IMAGING | Facility: CLINIC | Age: 37
End: 2017-06-25
Attending: INTERNAL MEDICINE
Payer: MEDICAID

## 2017-06-25 LAB
ALDOLASE SERPL-CCNC: 6.8
INTERPRETATION ECG - MUSE: NORMAL
LACTATE BLD-SCNC: 1 MMOL/L (ref 0.7–2.1)
MAGNESIUM SERPL-MCNC: 1.8 MG/DL (ref 1.6–2.3)
PHOSPHATE SERPL-MCNC: 2.2 MG/DL (ref 2.5–4.5)
POTASSIUM SERPL-SCNC: 4.2 MMOL/L (ref 3.4–5.3)
TROPONIN I SERPL-MCNC: NORMAL UG/L (ref 0–0.04)
TROPONIN I SERPL-MCNC: NORMAL UG/L (ref 0–0.04)

## 2017-06-25 PROCEDURE — 36415 COLL VENOUS BLD VENIPUNCTURE: CPT | Performed by: INTERNAL MEDICINE

## 2017-06-25 PROCEDURE — 25000132 ZZH RX MED GY IP 250 OP 250 PS 637: Performed by: PSYCHIATRY & NEUROLOGY

## 2017-06-25 PROCEDURE — 25000125 ZZHC RX 250: Performed by: INTERNAL MEDICINE

## 2017-06-25 PROCEDURE — 94150 VITAL CAPACITY TEST: CPT

## 2017-06-25 PROCEDURE — 25000132 ZZH RX MED GY IP 250 OP 250 PS 637: Performed by: INTERNAL MEDICINE

## 2017-06-25 PROCEDURE — 99233 SBSQ HOSP IP/OBS HIGH 50: CPT | Performed by: INTERNAL MEDICINE

## 2017-06-25 PROCEDURE — 25000128 H RX IP 250 OP 636: Performed by: PSYCHIATRY & NEUROLOGY

## 2017-06-25 PROCEDURE — 84132 ASSAY OF SERUM POTASSIUM: CPT | Performed by: PSYCHIATRY & NEUROLOGY

## 2017-06-25 PROCEDURE — 36415 COLL VENOUS BLD VENIPUNCTURE: CPT | Performed by: PSYCHIATRY & NEUROLOGY

## 2017-06-25 PROCEDURE — 72146 MRI CHEST SPINE W/O DYE: CPT

## 2017-06-25 PROCEDURE — 93010 ELECTROCARDIOGRAM REPORT: CPT | Performed by: INTERNAL MEDICINE

## 2017-06-25 PROCEDURE — 83605 ASSAY OF LACTIC ACID: CPT | Performed by: INTERNAL MEDICINE

## 2017-06-25 PROCEDURE — 25000128 H RX IP 250 OP 636: Performed by: INTERNAL MEDICINE

## 2017-06-25 PROCEDURE — 84100 ASSAY OF PHOSPHORUS: CPT | Performed by: PSYCHIATRY & NEUROLOGY

## 2017-06-25 PROCEDURE — 40000275 ZZH STATISTIC RCP TIME EA 10 MIN

## 2017-06-25 PROCEDURE — 83735 ASSAY OF MAGNESIUM: CPT | Performed by: PSYCHIATRY & NEUROLOGY

## 2017-06-25 PROCEDURE — 30233S1 TRANSFUSION OF NONAUTOLOGOUS GLOBULIN INTO PERIPHERAL VEIN, PERCUTANEOUS APPROACH: ICD-10-PCS | Performed by: INTERNAL MEDICINE

## 2017-06-25 PROCEDURE — 93005 ELECTROCARDIOGRAM TRACING: CPT

## 2017-06-25 PROCEDURE — 84484 ASSAY OF TROPONIN QUANT: CPT | Performed by: INTERNAL MEDICINE

## 2017-06-25 PROCEDURE — 12000007 ZZH R&B INTERMEDIATE

## 2017-06-25 RX ORDER — POTASSIUM CHLORIDE 29.8 MG/ML
20 INJECTION INTRAVENOUS
Status: DISCONTINUED | OUTPATIENT
Start: 2017-06-25 | End: 2017-07-01 | Stop reason: HOSPADM

## 2017-06-25 RX ORDER — POTASSIUM CHLORIDE 7.45 MG/ML
10 INJECTION INTRAVENOUS
Status: DISCONTINUED | OUTPATIENT
Start: 2017-06-25 | End: 2017-07-01 | Stop reason: HOSPADM

## 2017-06-25 RX ORDER — SENNOSIDES 8.6 MG
8.6 TABLET ORAL 2 TIMES DAILY PRN
Status: DISCONTINUED | OUTPATIENT
Start: 2017-06-25 | End: 2017-07-01 | Stop reason: HOSPADM

## 2017-06-25 RX ORDER — POTASSIUM CL/LIDO/0.9 % NACL 10MEQ/0.1L
10 INTRAVENOUS SOLUTION, PIGGYBACK (ML) INTRAVENOUS
Status: DISCONTINUED | OUTPATIENT
Start: 2017-06-25 | End: 2017-07-01 | Stop reason: HOSPADM

## 2017-06-25 RX ORDER — METHYLPREDNISOLONE SODIUM SUCCINATE 125 MG/2ML
100 INJECTION, POWDER, LYOPHILIZED, FOR SOLUTION INTRAMUSCULAR; INTRAVENOUS ONCE
Status: COMPLETED | OUTPATIENT
Start: 2017-06-25 | End: 2017-06-25

## 2017-06-25 RX ORDER — POTASSIUM CHLORIDE 1500 MG/1
20-40 TABLET, EXTENDED RELEASE ORAL
Status: DISCONTINUED | OUTPATIENT
Start: 2017-06-25 | End: 2017-07-01 | Stop reason: HOSPADM

## 2017-06-25 RX ORDER — POTASSIUM CHLORIDE 1.5 G/1.58G
20-40 POWDER, FOR SOLUTION ORAL
Status: DISCONTINUED | OUTPATIENT
Start: 2017-06-25 | End: 2017-07-01 | Stop reason: HOSPADM

## 2017-06-25 RX ADMIN — HYDROMORPHONE HYDROCHLORIDE 0.5 MG: 1 INJECTION, SOLUTION INTRAMUSCULAR; INTRAVENOUS; SUBCUTANEOUS at 08:38

## 2017-06-25 RX ADMIN — ACETAMINOPHEN 650 MG: 325 TABLET, FILM COATED ORAL at 19:08

## 2017-06-25 RX ADMIN — ACETAMINOPHEN 650 MG: 325 TABLET, FILM COATED ORAL at 09:06

## 2017-06-25 RX ADMIN — POTASSIUM PHOSPHATE, MONOBASIC AND POTASSIUM PHOSPHATE, DIBASIC 15 MMOL: 224; 236 INJECTION, SOLUTION, CONCENTRATE INTRAVENOUS at 13:35

## 2017-06-25 RX ADMIN — HUMAN IMMUNOGLOBULIN G 85 G: 40 LIQUID INTRAVENOUS at 19:03

## 2017-06-25 RX ADMIN — SENNOSIDES 8.6 MG: 8.6 TABLET, FILM COATED ORAL at 09:06

## 2017-06-25 RX ADMIN — RANITIDINE 150 MG: 150 TABLET ORAL at 22:22

## 2017-06-25 RX ADMIN — IBUPROFEN 600 MG: 600 TABLET ORAL at 11:19

## 2017-06-25 RX ADMIN — METHYLPREDNISOLONE SODIUM SUCCINATE 100 MG: 125 INJECTION, POWDER, FOR SOLUTION INTRAMUSCULAR; INTRAVENOUS at 17:56

## 2017-06-25 RX ADMIN — HYDROMORPHONE HYDROCHLORIDE 0.5 MG: 1 INJECTION, SOLUTION INTRAMUSCULAR; INTRAVENOUS; SUBCUTANEOUS at 00:18

## 2017-06-25 RX ADMIN — DIPHENHYDRAMINE HYDROCHLORIDE 25 MG: 25 CAPSULE ORAL at 17:56

## 2017-06-25 RX ADMIN — RANITIDINE 150 MG: 150 TABLET ORAL at 08:38

## 2017-06-25 RX ADMIN — IBUPROFEN 600 MG: 600 TABLET ORAL at 16:42

## 2017-06-25 NOTE — PROGRESS NOTES
Respiratory Therapy    Repeat NIF done with Dr. Dean and RN present -40 with good patient effort with sitting up in bed. NIF/FVC will be switched to Q4 per Dr. Dean. Will continue to monitor closely.    Sagrario Duncan RRT  6/25/2017

## 2017-06-25 NOTE — CONSULTS
FOLLOW UP GASTROINTESTINAL VISIT       Date 2017 -- I have to do this because they did not put him on my list, so I cannot do anything but a short note.       Candy Soto is in the midst of a workup for some type of viral syndrome and he tells me that his appetite is okay and he is not having a problem with his bowel movements and his pain in his abdomen is almost gone.  On examination now, he has slight tenderness in the left upper quadrant, but no other abnormalities were found.  And with that in mind, I told the patient I would not pursue any further GI workup and have him finish up his neurological workup and cardiac workup.      Thank you for allowing me to see this patient.         VEL BURNS MD             D: 2017 14:03   T: 2017 14:22   MT: [id number]      Name:     CANDY SOTO   MRN:      [id number]        Account:       [id number]   :      1980           Consult Date:  2017      Document: [id number]

## 2017-06-25 NOTE — PROGRESS NOTES
Cross coverage: Patient admitted with fever and abdominal pain but biggest concern currently is the possibility of AIDP.  Status post LP and neurology was consulted earlier yesterday afternoon.  Decision has been made to start patient on IVIG, Arcenio post one dose.  At this time, I was asked to assess the patient secondary to decrease in NIF.  Earlier last night, the patient's NIF was -40 but repeat NIF was -19 with fair effort.  On examination, patient is alert and able to protect his airways.  He is able to wiggle and flex at the ankle but proximal motor strength is 0-1/5.  Repeat NIF at the bedside was -40 with good effort and better patient seal.  Respiratory status otherwise  Stable with clear lungs bilaterally.  Discussed with nursing staff. Will keep patient on the floor for now with q.4 hours NIF.  If worsening NIF, will transfer patient to the ICU for closer respiratory/airway monitoring

## 2017-06-25 NOTE — PROGRESS NOTES
Respiratory Therapy    Patient's NIF/FVC done this evening, NIF -40 with good effort, FVC 1.9L with poor effort repeat done with FVC at 1.6 with 5 minutes a part. Admitting doctor paged. RT will continue to assess and monitor.    Sagrario Duncan RRT  6/24/2017

## 2017-06-25 NOTE — PROGRESS NOTES
" INFECTIOUS DISEASE Progress Note  June 25, 2017  [id number]  René Grigsby    ANTIBIOTICS:  none    SUBJECTIVE: Tm 101.7, notes reviewed, cont to have some chest and upper abdominal discomfort, fatigued per family, when up to bathroom legs came together-seemed weak, low back still hurt, started on ivig--had some chest pain during infusion, no cough, NIF low    OBJECTIVE:  /59  Pulse 86  Temp 101.7  F (38.7  C) (Oral)  Resp 16  Ht 1.753 m (5' 9\")  Wt 109.4 kg (241 lb 3.2 oz)  SpO2 98%  BMI 35.62 kg/m2  Alert, non toxic, does seem fatigued  HEENT: no LA  Oral clear  LUNG: CTA anteriorly  COR: RRR, no murmur or rub  BACK mildly tender L spine  ABDOMEN: soft, mildly tender in epigastrium, ND  EXT: legs 2+ edema at least  No rash  No stig IE  No effusion  Neuro:  Motor arms 5/5, legs DF,PF 5/5, HF-can lift off bed -still weak but much better today  LAB Data:    Attestation:  I have reviewed today's vital signs, notes, medications, labs and imaging.     Data:  CBC  Recent Labs  Lab 06/24/17  0703   06/23/17  0230 06/22/17  1835   WBC 21.5*  < > 15.9* 19.4*   HGB 17.3  --  16.3 16.5     --  207 240   < > = values in this interval not displayed.     BMP  Recent Labs  Lab 06/24/17  0703 06/23/17  1150 06/22/17  1835    136 137   POTASSIUM 4.4 4.7 4.0   CHLORIDE 103 103 101   YENNY 7.6* 7.7* 8.6   CO2 24 27 28   BUN 10 8 8   CR 1.10 1.27* 1.14   GLC 99 119* 96         CULTURES  Recent Labs  Lab 06/22/17  1910 06/22/17  1835   CULT No growth after 2 days No growth after 2 days   CSF 5 WBC protein 35 Glucose 66  BC 6/24 NG x 2     MRI L spine-  FINDINGS: Five lumbar vertebrae are assumed. Vertebral body heights  and sagittal alignment are within normal limits. The conus medullaris  is unremarkable in appearance on the sagittal images. Disc signal and  disc heights are within normal limits throughout the lumbar spine.  Apophyseal joints appear within normal limits and no periarticular  reactive marrow " edema. Transverse images from the inferior aspect of  the L1 vertebral body to the top of the sacrum reveal no evidence of  lumbar disc bulge or herniation. There is no central or foraminal  stenosis.          IMPRESSION: No lumbar degenerative loss of disc signal or disc  herniation. No stenosis or apparent neural impingement.     CT chest-  FINDINGS:  No evidence for pulmonary embolism. The thoracic aorta is  of normal caliber, without evidence for thoracic aortic aneurysm or  dissection. There are small bilateral pleural effusions. No  pericardial effusion. No enlarged lymph nodes are identified in the  chest. Mild atelectasis at both lung bases posteriorly. The lungs are  otherwise clear. Limited views of the upper abdomen are unremarkable.  No aggressive-appearing bone lesions.           IMPRESSION:   1. No evidence for pulmonary embolism or thoracic aortic dissection.    2. Small bilateral pleural effusions     TTE-  Left ventricular systolic function is normal.  No regional wall motion abnormalities noted.  Normal cardiac valves. Mild to Mild-moderate RV dilatation noted.  _____________________________________________________________________________  __        Left Ventricle  The left ventricle is normal in size. There is normal left ventricular wall  thickness. Left ventricular systolic function is normal. The visual ejection  fraction is estimated at 60-65%. Left ventricular diastolic function is  indeterminate. E by E prime ratio is between 8 and 15, which is indeterminate  for assessment of left ventricular filling pressures. No regional wall motion  abnormalities noted.     Right Ventricle  The right ventricle is mild to moderately dilated. The right ventricular  systolic function is normal.     Atria  Normal left atrial size. Right atrial size is normal. Likely prominent  Eustachian valve/Chiari network noted. A patent foramen ovale is suspected.     Mitral Valve  The mitral valve leaflets appear  normal. There is no evidence of stenosis,  fluttering, or prolapse. There is physiologic mitral regurgitation.        Tricuspid Valve  The tricuspid valve is not well visualized, but is grossly normal. There is  trace to mild tricuspid regurgitation. The right ventricular systolic pressure  is approximated at 13.1 mmHg plus the right atrial pressure. Right ventricular  systolic pressure could be underestimated due to incomplete tricuspid  regurgitation velocity envelope. Normal IVC (1.5-2.5cm) with >50% respiratory  collapse; right atrial pressure is estimated at 5-10mmHg.     Aortic Valve  The aortic valve is trileaflet. No aortic regurgitation is present. No aortic  stenosis is present.     Pulmonic Valve  The pulmonic valve is not well seen, but is grossly normal. There is no  pulmonic valvular regurgitation. Normal pulmonic valve velocity.     Vessels  The aortic root is normal size. Normal size ascending aorta. The IVC is normal  in size and reactivity with respiration, suggesting normal central venous  pressure.     Pericardium  Trivial pericardial effusion.      Attestation:  I have reviewed today's vital signs, notes, medications, labs and imaging.     ASSESSMENT:  1. FEVERS-unclear etiology, ? Spine infection but MRI L spine neg, BC neg, ? Myocarditis but CK normal and LV EF normal, ? Pericarditis, trivial fluid around pericardium,  procalcitonin normal; BC remain negative, not clear if any infection, ? Viral myocarditis  2. ABDOMINAL and CHEST PAIN-pleuritic, ? Cardiac, TTE ok but moderate RV dilation-? Related to edema, wt gain  3. LEUKOCYTOSIS-ID w/u so far negative but still concern ID process  4. EDEMA, WT GAIN-? RV failure, ? Pulmonary HTN, would ask cards to assess  5. RV DILATION-mild/moderate by TTE, cardiology; ? Related to edema/wt gain, ? Focal myocarditis  5. ? ADIP-on ivig, LP ok, MRI L spine ok, ? MRI T spine        RECOMMENDATION  1. F/u temps, BC  2. Defer antibiotics-not sure yet what we  would be treating  3. Cards consult, Other w/u edema, RV dilation  4. ? Need to MRI T spine  5. Defer ivig to neuro  D/w Dr Cummins-tatiana to see     SELENA BOYER M.D.  O:[phone]   B:[phone]

## 2017-06-25 NOTE — PLAN OF CARE
Problem: Goal Outcome Summary  Goal: Goal Outcome Summary  Outcome: Declining     0280-9884: Resumed cares for this patient who was having active chest pain. Dominick Lake at [facility], obtained Trops, IVIG started around 1900, infusing at 140 mL/hr, rate maintained at 140 mL/hr dues to chest pain/chest tightness, MDaware, trops negative. Pt describes chest discomfort with bounding feeling. Received one time dose IV dilaudid 0.5 mg around midnight for chest discomfort. Completed first dose of IVIG at 0300.Tele SR w/inverted T waves, HR 80s-110s. Obtained 12 lead EKG showed normal SR, labs, Mg 1.8,  K+ 4.2, and phos 2.2. Pt not on protocol. Pt was monitored closely for worsening symptoms and for neuro changes. Numbness and tingling sensation present to BLE, occasional muscle twitching noted with decreased strength. 2-3+ gen edema, worse in the LE. RT following, performing NIF/FVC at [facility].       0400: , Her was paged by RT due to decrease in NIF from early reading, MD at bedside. Plan is to cont to monitor patient closely on this floor with Q4hrs NIF monitoring. Per MD if pt is worsening may consider transfer patient to ICU for respiratory and airway monitoring. Sats upper 90s on continuous pulse oximetry. Neuro and ID following, see notes for recommendation. Will cont with POC.

## 2017-06-25 NOTE — PLAN OF CARE
Problem: Goal Outcome Summary  Goal: Goal Outcome Summary  Outcome: Improving  VSS. Soft BP, tachy HR late 90's-110's. Febrile in the am 101/100's. Given tylenol with no relief. Down to 98.5 after ibuprofen. A/O x4, slightly groggy after dilaudid is given. Pt. Having back pain, 5/10 at procedure site and chest pain 5/10.  Trops drawn, see results. Dilaudid 0.5 mg given in am, ibprofen and an ice pack given around 11 due to little relief. K+, 4.2, Mag,. 1.8, Phos, 2.2. Phos replaced. Cardiology consulted due to TTE results. MRI, CT, EKG and lumbar puncture done, see results. ISO-E dc'd. NIF Q4 with respiratory therapy, see results. Pt. Able to transfer to ICU if breathing worsens. Pt. has not had a bowel movement. Given Senna with no results.       MR Lumbar spine ordered, trops negative, GI consulted/rounded

## 2017-06-25 NOTE — PROGRESS NOTES
Patient c/o low back pain and weakness in legs. He also developed chest pain after IVIG. He has h/o abdominal pain since admission.  He has proximal muscle weakness in legs bilaterally.  Reflexes intact.  LP- normal  Pulmonary functions normal.  Patient denies any stress. No camping trips.  Imp: Possible GBS  EMG in the morning.  Continue IVIG. Premedicate with IV solumedrol 100mg and Benadryl 25 mg.

## 2017-06-25 NOTE — PROGRESS NOTES
Respiratory Therapy    NIF done on patient because he said his breathing was getting heavier, NIF was -16 @ 04:01 this morning. Admitting doctor paged to inform. Will continue to assess and monitor closely.    Sagrario Duncan RRT  6/25/2017

## 2017-06-26 ENCOUNTER — APPOINTMENT (OUTPATIENT)
Dept: CARDIOLOGY | Facility: CLINIC | Age: 37
End: 2017-06-26
Attending: INTERNAL MEDICINE
Payer: MEDICAID

## 2017-06-26 LAB
ANION GAP SERPL CALCULATED.3IONS-SCNC: 4 MMOL/L (ref 3–14)
BASOPHILS # BLD AUTO: 0 10E9/L (ref 0–0.2)
BASOPHILS NFR BLD AUTO: 0.2 %
BUN SERPL-MCNC: 11 MG/DL (ref 7–30)
CALCIUM SERPL-MCNC: 7.8 MG/DL (ref 8.5–10.1)
CHLORIDE SERPL-SCNC: 108 MMOL/L (ref 94–109)
CO2 SERPL-SCNC: 28 MMOL/L (ref 20–32)
COPATH REPORT: NORMAL
CREAT SERPL-MCNC: 0.82 MG/DL (ref 0.66–1.25)
DIFFERENTIAL METHOD BLD: ABNORMAL
EOSINOPHIL # BLD AUTO: 2.1 10E9/L (ref 0–0.7)
EOSINOPHIL NFR BLD AUTO: 17.9 %
ERYTHROCYTE [DISTWIDTH] IN BLOOD BY AUTOMATED COUNT: 13.6 % (ref 10–15)
GFR SERPL CREATININE-BSD FRML MDRD: ABNORMAL ML/MIN/1.7M2
GLUCOSE SERPL-MCNC: 141 MG/DL (ref 70–99)
HCT VFR BLD AUTO: 34.8 % (ref 40–53)
HGB BLD-MCNC: 11.5 G/DL (ref 13.3–17.7)
IMM GRANULOCYTES # BLD: 0.2 10E9/L (ref 0–0.4)
IMM GRANULOCYTES NFR BLD: 1.3 %
LYMPHOCYTES # BLD AUTO: 0.7 10E9/L (ref 0.8–5.3)
LYMPHOCYTES NFR BLD AUTO: 5.7 %
MCH RBC QN AUTO: 27.7 PG (ref 26.5–33)
MCHC RBC AUTO-ENTMCNC: 33 G/DL (ref 31.5–36.5)
MCV RBC AUTO: 84 FL (ref 78–100)
MONOCYTES # BLD AUTO: 0.3 10E9/L (ref 0–1.3)
MONOCYTES NFR BLD AUTO: 2.2 %
NEUTROPHILS # BLD AUTO: 8.6 10E9/L (ref 1.6–8.3)
NEUTROPHILS NFR BLD AUTO: 72.7 %
NRBC # BLD AUTO: 0 10*3/UL
NRBC BLD AUTO-RTO: 0 /100
PHOSPHATE SERPL-MCNC: 1.9 MG/DL (ref 2.5–4.5)
PHOSPHATE SERPL-MCNC: 3.5 MG/DL (ref 2.5–4.5)
PLATELET # BLD AUTO: 208 10E9/L (ref 150–450)
POTASSIUM SERPL-SCNC: 4.2 MMOL/L (ref 3.4–5.3)
RBC # BLD AUTO: 4.15 10E12/L (ref 4.4–5.9)
SODIUM SERPL-SCNC: 140 MMOL/L (ref 133–144)
WBC # BLD AUTO: 11.8 10E9/L (ref 4–11)

## 2017-06-26 PROCEDURE — 93321 DOPPLER ECHO F-UP/LMTD STD: CPT | Mod: 26 | Performed by: INTERNAL MEDICINE

## 2017-06-26 PROCEDURE — 93005 ELECTROCARDIOGRAM TRACING: CPT

## 2017-06-26 PROCEDURE — 80048 BASIC METABOLIC PNL TOTAL CA: CPT | Performed by: INTERNAL MEDICINE

## 2017-06-26 PROCEDURE — 84100 ASSAY OF PHOSPHORUS: CPT | Performed by: INTERNAL MEDICINE

## 2017-06-26 PROCEDURE — 93010 ELECTROCARDIOGRAM REPORT: CPT | Performed by: INTERNAL MEDICINE

## 2017-06-26 PROCEDURE — 94150 VITAL CAPACITY TEST: CPT

## 2017-06-26 PROCEDURE — 40000275 ZZH STATISTIC RCP TIME EA 10 MIN

## 2017-06-26 PROCEDURE — 93308 TTE F-UP OR LMTD: CPT | Mod: 26 | Performed by: INTERNAL MEDICINE

## 2017-06-26 PROCEDURE — 93325 DOPPLER ECHO COLOR FLOW MAPG: CPT | Mod: 26 | Performed by: INTERNAL MEDICINE

## 2017-06-26 PROCEDURE — 25000132 ZZH RX MED GY IP 250 OP 250 PS 637: Performed by: INTERNAL MEDICINE

## 2017-06-26 PROCEDURE — 25000125 ZZHC RX 250: Performed by: INTERNAL MEDICINE

## 2017-06-26 PROCEDURE — 12000007 ZZH R&B INTERMEDIATE

## 2017-06-26 PROCEDURE — 25000128 H RX IP 250 OP 636: Performed by: INTERNAL MEDICINE

## 2017-06-26 PROCEDURE — 85025 COMPLETE CBC W/AUTO DIFF WBC: CPT | Performed by: INTERNAL MEDICINE

## 2017-06-26 PROCEDURE — 36415 COLL VENOUS BLD VENIPUNCTURE: CPT | Performed by: INTERNAL MEDICINE

## 2017-06-26 PROCEDURE — 93308 TTE F-UP OR LMTD: CPT

## 2017-06-26 PROCEDURE — 99233 SBSQ HOSP IP/OBS HIGH 50: CPT | Performed by: INTERNAL MEDICINE

## 2017-06-26 RX ORDER — FUROSEMIDE 10 MG/ML
20 INJECTION INTRAMUSCULAR; INTRAVENOUS EVERY 12 HOURS
Status: DISCONTINUED | OUTPATIENT
Start: 2017-06-26 | End: 2017-06-29

## 2017-06-26 RX ADMIN — RANITIDINE 150 MG: 150 TABLET ORAL at 08:27

## 2017-06-26 RX ADMIN — IBUPROFEN 600 MG: 600 TABLET ORAL at 09:11

## 2017-06-26 RX ADMIN — RANITIDINE 150 MG: 150 TABLET ORAL at 20:51

## 2017-06-26 RX ADMIN — SODIUM PHOSPHATE, MONOBASIC, MONOHYDRATE AND SODIUM PHOSPHATE, DIBASIC, ANHYDROUS 20 MMOL: 276; 142 INJECTION, SOLUTION INTRAVENOUS at 14:05

## 2017-06-26 RX ADMIN — FUROSEMIDE 20 MG: 10 INJECTION, SOLUTION INTRAVENOUS at 20:51

## 2017-06-26 RX ADMIN — OXYCODONE HYDROCHLORIDE 5 MG: 5 TABLET ORAL at 09:11

## 2017-06-26 RX ADMIN — IBUPROFEN 600 MG: 600 TABLET ORAL at 16:34

## 2017-06-26 RX ADMIN — POTASSIUM CHLORIDE AND SODIUM CHLORIDE: 900; 150 INJECTION, SOLUTION INTRAVENOUS at 03:22

## 2017-06-26 RX ADMIN — FUROSEMIDE 20 MG: 10 INJECTION, SOLUTION INTRAVENOUS at 14:02

## 2017-06-26 ASSESSMENT — PAIN DESCRIPTION - DESCRIPTORS
DESCRIPTORS: CRAMPING
DESCRIPTORS: SPASM

## 2017-06-26 NOTE — PROGRESS NOTES
[facility]  Hospitalist Progress Note  Stvee Morris MD 06/26/2017    Reason for Stay (Diagnosis): fever         Assessment and Plan:      Summary of Stay: René Grigsby is a 36 year old male admitted on 6/22/2017 with fever and abdominal pain. Pt states since Tuesday he has been having sharp abdominal pain that comes and goes with some nausea. No emesis or loose stools. Also has had fevers and weakness of LE's and is being treated for possible GBS with IVIG.  no bacterial source for infection has been found despite extensive eval including thoracic and lumbar spine MRI, chest/abd/pelvis CT, blood cx, lumbar puncture.  procal level normal.  Remains off antibiotics.  ? Improvement in fever curve past 24 hours.  Having sharp L sided pleuritic CP with deep breathing - given small effusion on TTE wonder about pericarditis     Problem List:      1. Fever.  - Etiology not determined. Patient still continues to have fever but fever curve seems to be improving  - Cx NGTD.  - No clear evidence of bacterial infection. Favor viral infection          2. Abdominal Pain.  - Initially epigastric. No emesis/loose stools.  - CT A/P did not show any acute process. He was evaluated by GI. No specific focal symptoms.      3. Weakness of LE's. 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  Neurology following. On IVIG per neurology. Has weakness of lower extremities; feels that is improved today and primarily noted in B hip flexors. T-spine and L-spine MRI shows no pathology to explain sx.  EMG planned for today     4.  CP - pleuritic.  ? Pericarditis or pleurisy related to fever and potential viral infection.  Sx not c/w ACS and troponin negative    5.  Weight gain:  Suspect some degree of volume overload.  Will diurese with Lasix 20 mg IV q12h for now     DVT Prophylaxis:  -  PCD's.  Code Status: Full Code  Discharge Dispo: TBD.  Estimated Disch Date / # of Days until Discharge: 2-3 more days; fever curve needs to improve,  "weakness needs to improve, needs to complete IVIG           Interval History (Subjective):      Thinks leg weakness better today.  Fever curve better.  Overall better than on admit                  Physical Exam:      Last Vital Signs:  /62 (BP Location: Right arm)  Pulse 86  Temp 98.5  F (36.9  C) (Oral)  Resp 24  Ht 1.753 m (5' 9\")  Wt 110.1 kg (242 lb 12.8 oz)  SpO2 99%  BMI 35.86 kg/m2      Intake/Output Summary (Last 24 hours) at 06/26/17 1227  Last data filed at 06/26/17 0531   Gross per 24 hour   Intake              150 ml   Output                0 ml   Net              150 ml       Constitutional: Awake, alert, cooperative, no apparent distress.  Wife present at bedside   Respiratory: Clear to auscultation bilaterally, no crackles or wheezing   Cardiovascular: Regular rate and rhythm, normal S1 and S2, and no murmur noted   Abdomen: Normal bowel sounds, soft, non-distended, non-tender   Skin: No rashes, no cyanosis, dry to touch   Neuro: Alert and oriented x3, hip flexors 3-4/5 B.  Dorsi and plantarflexion normal B.  Sensation normal   Extremities: trace edema, normal range of motion   Other(s):        All other systems: Negative          Medications:      All current medications were reviewed with changes reflected in problem list.         Data:      All new lab and imaging data was reviewed.   Labs:    Recent Labs  Lab 06/26/17  0607 06/24/17  0703 06/23/17  1150 06/23/17  0230   WBC 11.8* 21.5* 20.5* 15.9*   HGB 11.5* 17.3  --  16.3   HCT 34.8* 52.2  --  49.4   MCV 84 85  --  85    254  --  207      Imaging:   Recent Results (from the past 24 hour(s))   MR Thoracic Spine w/o Contrast    Narrative    MR THORACIC SPINE W/O CONTRAST 6/25/2017 4:18 PM     HISTORY: back pain.    TECHNIQUE: Multiplanar multisequence MRI of the thoracic spine without  gadolinium contrast.    COMPARISON: None.    FINDINGS: The thoracic cord appears to be normal in size. It has  normal signal intensity. No " lytic or destructive or infiltrative  lesions are seen.    At the T4-5 level there is dehydration of the disc. There is a mild  annular disc bulge.    At the T5-6 level there is dehydration of the disc. There is a small  left central disc herniation causing mild mass effect on the dural  sac. This is best seen on axial image 11 of series 7.    At the T6-7 level there is a small right central disc herniation  causing slight mass effect on the dural sac. This is best seen on  axial image 18 of series 8.    Small bilateral pleural effusions appear to be present. There is soft  tissue edema in the posterior paraspinal musculature is of the lower  thoracic spine and lumbar spine. The edema is better seen on the  recent MR scan of the lumbar spine.      Impression    IMPRESSION:    1. Small thoracic disc herniations at T5-6 and T6-7.  2. Small bilateral pleural effusions. These were also present on the  recent chest CT.  3. Soft tissue edema in the posterior spinal musculature. This is best  seen on the recent MR scan of the lumbar spine. This could be due to  muscle strain.       TIM ATKINS MD

## 2017-06-26 NOTE — PROGRESS NOTES
"[facility]  Infectious Disease Progress Note          Assessment and Plan:   IMP 1 35 yo male 2 week illness, fever, HA, malise, weakness, doubt bacterial ? Viral, eosinophilia of sig note  2 Fever, essentially FUO, ALMANZA neg  3 Eosinophilia, ??    REc  1no antibioitics, await cxs and serologies  2 LP wo sign CNS infection  2 Neuro tx noted        Interval History:   no new complaints and stable; reviewed all in detail, notable eosinophilia initial 3000 eos, cxs all pending  WBC 11 K now              Medications:       furosemide  20 mg Intravenous Q12H     sodium phosphate (NaPHOS) intermittent infusion  20 mmol Intravenous Once     sodium chloride (PF)  3 mL Intracatheter Q8H     ranitidine  150 mg Oral BID                  Physical Exam:   Blood pressure 99/61, pulse 86, temperature 97.6  F (36.4  C), temperature source Oral, resp. rate 18, height 1.753 m (5' 9\"), weight 110.1 kg (242 lb 12.8 oz), SpO2 97 %.  Wt Readings from Last 2 Encounters:   06/26/17 110.1 kg (242 lb 12.8 oz)     Vital Signs with Ranges  Temp:  [96.9  F (36.1  C)-101.1  F (38.4  C)] 97.6  F (36.4  C)  Heart Rate:  [] 76  Resp:  [16-24] 18  BP: ()/(46-69) 99/61  SpO2:  [93 %-99 %] 97 %    Constitutional: Awake, alert, cooperative, no apparent distress   Lungs: Clear to auscultation bilaterally, no crackles or wheezing   Cardiovascular: Regular rate and rhythm, normal S1 and S2, and no murmur noted   Abdomen: Normal bowel sounds, soft, non-distended, non-tender   Skin: No rashes, no cyanosis, no edema   Other:           Data:   All microbiology laboratory data reviewed.  Recent Labs   Lab Test  06/26/17   0607  06/24/17   0703  06/23/17   1150  06/23/17   0230   WBC  11.8*  21.5*  20.5*  15.9*   HGB  11.5*  17.3   --   16.3   HCT  34.8*  52.2   --   49.4   MCV  84  85   --   85   PLT  208  254   --   207     Recent Labs   Lab Test  06/26/17   0607  06/24/17   0703  06/23/17   1150   CR  0.82  1.10  1.27* "     Recent Labs   Lab Test  06/23/17   1150   SED  4     Recent Labs   Lab Test  06/24/17   1820  06/24/17   1812  06/24/17   1435  06/22/17   1910  06/22/17   1835   CULT  No growth after 2 days  No growth after 2 days  Culture negative monitoring continues  No growth after 4 days  No growth after 4 days

## 2017-06-26 NOTE — PLAN OF CARE
Problem: Goal Outcome Summary  Goal: Goal Outcome Summary  Cared for patient for 4 hours: A/Ox4, independent, VSS, Phos 1.9 this AM, IV replacement infusing, one time dose 20mg IV lasix given. Plan is for EMG today. D/C 2-3 days. POC reviewed with patient, questions answered.

## 2017-06-26 NOTE — PLAN OF CARE
"Problem: Goal Outcome Summary  Goal: Goal Outcome Summary  Outcome: No Change  Alert/oriented. 0224-0164 complaint of upper Left chest/neck/shoulder muscular \"bothersome\" pain 7/10. RR 24, shallow. VSS. EKG done/normal. TTele ST low 100's. Denies numb/tingling to feet legs or anywhere when up in bed. Some tingling in feet when up to BR \"that's fran of all the fluid in my legs) per pt.  BLE +1-2 edema. Lungs clear. BM today. Tolerating low fiber diet. No complaints w/voiding. Oxycodone 5mg and ibuprophen given w/pt stating RU chest/muscle pain decreased. RR regular depth/rate. Vital capacity poor per RT this a.m. - monitor resp status closely.      "

## 2017-06-26 NOTE — PROGRESS NOTES
NIF/VC done this morning, NIF -28, VC 1.46L with poor effort. Pt said that this is the best he can do because of he is in a lot of pain. RN notified.    Katerin Porras, RT  6/26/2017 9:15 AM

## 2017-06-26 NOTE — PROGRESS NOTES
Patient is feeling better. He denies any low back pain or weakness in the legs.  He is able to walk normally.  He had good bowel movement.  He has mild chest pain and he was suspected to have pericarditis.  His exam is normal with no evidence of focal weakness or reflex asymmetry.  His EMG is pending.  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  No etiology of fever and eosinophilia detected.  He can be discharged after EMG.

## 2017-06-26 NOTE — PLAN OF CARE
Problem: Goal Outcome Summary  Goal: Goal Outcome Summary  Outcome: Improving  Tele SR/ST. A&OX4. Temp 101.1. Tylenol given X1. Afebrile after receiving Tylenol. Complains of intermittent chest pain. Worsened with exertion. MD aware. 1-2+ edema to bilateral LE's. Bilateral mild weakness to LE's. Strength has imprved from yesterday. Tingling to lower legs still persists. 2nd dose of IVIG started at 1845. Rate titrated based on protocol. Patient tolerating well. Received one time dose solu-medrol before. Potassium phosphate stopped until IVIG infuses. Ambulates with standby assist.

## 2017-06-26 NOTE — PLAN OF CARE
Problem: Goal Outcome Summary  Goal: Goal Outcome Summary  Outcome: No Change  Pt slept on and off during the night. VSS. Neuro intact. Denies any pain or nausea. IgG finished infusing, and phos was restarted. Pt transfers with SBA to the BR, voiding adequately. Slight tingling, weakness and BLE edema in BLE. Tele reads ST. Will continue to monitor.

## 2017-06-27 LAB
ANION GAP SERPL CALCULATED.3IONS-SCNC: 4 MMOL/L (ref 3–14)
BUN SERPL-MCNC: 12 MG/DL (ref 7–30)
CALCIUM SERPL-MCNC: 7.7 MG/DL (ref 8.5–10.1)
CHLORIDE SERPL-SCNC: 107 MMOL/L (ref 94–109)
CO2 SERPL-SCNC: 31 MMOL/L (ref 20–32)
CREAT SERPL-MCNC: 0.88 MG/DL (ref 0.66–1.25)
GFR SERPL CREATININE-BSD FRML MDRD: ABNORMAL ML/MIN/1.7M2
GLUCOSE SERPL-MCNC: 93 MG/DL (ref 70–99)
INTERPRETATION ECG - MUSE: NORMAL
INTERPRETATION ECG - MUSE: NORMAL
PHOSPHATE SERPL-MCNC: 4 MG/DL (ref 2.5–4.5)
PLATELET # BLD AUTO: 212 10E9/L (ref 150–450)
POTASSIUM SERPL-SCNC: 3.5 MMOL/L (ref 3.4–5.3)
SODIUM SERPL-SCNC: 142 MMOL/L (ref 133–144)

## 2017-06-27 PROCEDURE — 99233 SBSQ HOSP IP/OBS HIGH 50: CPT | Performed by: INTERNAL MEDICINE

## 2017-06-27 PROCEDURE — 84100 ASSAY OF PHOSPHORUS: CPT | Performed by: INTERNAL MEDICINE

## 2017-06-27 PROCEDURE — 25000128 H RX IP 250 OP 636: Performed by: INTERNAL MEDICINE

## 2017-06-27 PROCEDURE — 85049 AUTOMATED PLATELET COUNT: CPT | Performed by: INTERNAL MEDICINE

## 2017-06-27 PROCEDURE — 84110 ASSAY OF PORPHOBILINOGEN: CPT | Performed by: INTERNAL MEDICINE

## 2017-06-27 PROCEDURE — 25000132 ZZH RX MED GY IP 250 OP 250 PS 637: Performed by: INTERNAL MEDICINE

## 2017-06-27 PROCEDURE — 80048 BASIC METABOLIC PNL TOTAL CA: CPT | Performed by: INTERNAL MEDICINE

## 2017-06-27 PROCEDURE — 94150 VITAL CAPACITY TEST: CPT

## 2017-06-27 PROCEDURE — 12000007 ZZH R&B INTERMEDIATE

## 2017-06-27 PROCEDURE — 40000275 ZZH STATISTIC RCP TIME EA 10 MIN

## 2017-06-27 PROCEDURE — 82135 ASSAY AMINOLEVULINIC ACID: CPT | Performed by: INTERNAL MEDICINE

## 2017-06-27 PROCEDURE — 36415 COLL VENOUS BLD VENIPUNCTURE: CPT | Performed by: INTERNAL MEDICINE

## 2017-06-27 PROCEDURE — 84120 ASSAY OF URINE PORPHYRINS: CPT | Performed by: PSYCHIATRY & NEUROLOGY

## 2017-06-27 PROCEDURE — 84120 ASSAY OF URINE PORPHYRINS: CPT | Performed by: INTERNAL MEDICINE

## 2017-06-27 RX ORDER — POTASSIUM CHLORIDE 1.5 G/1.58G
20 POWDER, FOR SOLUTION ORAL 2 TIMES DAILY
Status: DISCONTINUED | OUTPATIENT
Start: 2017-06-27 | End: 2017-06-28

## 2017-06-27 RX ADMIN — FUROSEMIDE 20 MG: 10 INJECTION, SOLUTION INTRAVENOUS at 23:15

## 2017-06-27 RX ADMIN — FUROSEMIDE 20 MG: 10 INJECTION, SOLUTION INTRAVENOUS at 13:08

## 2017-06-27 RX ADMIN — IBUPROFEN 600 MG: 600 TABLET ORAL at 16:44

## 2017-06-27 RX ADMIN — HYDROMORPHONE HYDROCHLORIDE 0.5 MG: 1 INJECTION, SOLUTION INTRAMUSCULAR; INTRAVENOUS; SUBCUTANEOUS at 19:26

## 2017-06-27 RX ADMIN — RANITIDINE 150 MG: 150 TABLET ORAL at 09:07

## 2017-06-27 RX ADMIN — RANITIDINE 150 MG: 150 TABLET ORAL at 20:36

## 2017-06-27 RX ADMIN — POTASSIUM CHLORIDE 20 MEQ: 1.5 POWDER, FOR SOLUTION ORAL at 09:07

## 2017-06-27 RX ADMIN — POTASSIUM CHLORIDE 20 MEQ: 1.5 POWDER, FOR SOLUTION ORAL at 20:36

## 2017-06-27 RX ADMIN — IBUPROFEN 600 MG: 600 TABLET ORAL at 09:44

## 2017-06-27 NOTE — PLAN OF CARE
Problem: Goal Outcome Summary  Goal: Goal Outcome Summary  Outcome: Improving  Pt alert and oriented.  Up in hallway, able to ambulate independently and tolerated well.  Lung sound CTA.  Pt reports that lungs feel congested, receiving Ibuprofen with some relief for pleurisy diagnosis.  VSS.  Telemetry SR.  Neuro signs WNL.  Tolerating low fiber diet po.  Had EMG.  Bilateral lower extremity edema present, moderate edema present thighs, and calves, receiving lasix IV. Potassium 3.5.  Phosphate level 4.0.  Pt to discharge to home in 1-2 days pending normal labs, decreased leg edema, and breathing WNL.     Admitting Hospitalist web paged that Pt stood up and experienced severe pain starting in his lower spine up to his head with a severe headache.  Plan to give dilaudid IV.

## 2017-06-27 NOTE — PROGRESS NOTES
Hospitalist MD web paged that Neurology MD states Pt ok to discharge after checkinh urine and blood for acute porferior as this can cause his neurology symptoms FYI.

## 2017-06-27 NOTE — PLAN OF CARE
"Problem: Goal Outcome Summary  Goal: Goal Outcome Summary  Outcome: Improving  VSS. Afebrile. Tele: SR-ST. A&O. Stand by assist. Complained of 4/10 chest pain, \"muscle tightness,\" nothing new per pt, declined interventions, possible pericarditis. IV lasix Q12hrs. Per neuro, will have EMG today and then can d/c.       "

## 2017-06-27 NOTE — PROGRESS NOTES
Patient c/o shortness of breath with activity.  No abdominal pain, back pain or weakness in legs.  EMG- normal. No evidence of GBS.  He had normal spinal tap.  The quick improvement in his clinical symptoms after IVIG and solumedrol is uncommon for GBS.  His initial presentation with abdominal pain and ileus with neurological symptoms may raise the suspicion for Acute Intermittent porphyria.  Check urine PBG and ALA with serum porphyrin levels.  Patient can be discharged home from neurological stand point.

## 2017-06-27 NOTE — PLAN OF CARE
Problem: Infection, Risk/Actual (Adult)  Goal: Identify Related Risk Factors and Signs and Symptoms  Related risk factors and signs and symptoms are identified upon initiation of Human Response Clinical Practice Guideline (CPG)  Outcome: Improving  VSS, afeb, Liam=97.6. LS are clear, 97% on RA. Pt states he is feeling much better today, his legs are now only slightly swollen. Pt has good appetite, and drinking good amt of fluids. Pt denies pain, nausea. Phos replaced today, recheck at 2200, also recheck in AM. Pt and wife updated on POC. Pt will have EMG in AM, and neuro says he can dc home after that. Tele is SR.

## 2017-06-27 NOTE — PROGRESS NOTES
[facility]  Hospitalist Progress Note  Steve Morris MD 06/27/2017    Reason for Stay (Diagnosis): fever, weakness, presumed GBS         Assessment and Plan:      Summary of Stay: René Grigsby is a 36 year old male admitted on 6/22/2017 with fever and abdominal pain. Pt states since Tuesday he has been having sharp abdominal pain that comes and goes with some nausea. No emesis or loose stools. Also has had fevers and weakness of LE's and is being treated for possible GBS with IVIG.  no bacterial source for infection has been found despite extensive eval including thoracic and lumbar spine MRI, chest/abd/pelvis CT, blood cx, lumbar puncture.  procal level normal.  eosinophilia on periph smear - cause unclear.  Remains off antibiotics.  Improvement in fever curve past 24-48 hours.  Having sharp L sided pleuritic CP with deep breathing - given small effusion on TTE wonder about pericarditis      Problem List:       1. Fever.  - Etiology not determined. Patient still continues to have fever but fever curve seems to be improving  - Cx NGTD.  - No clear evidence of bacterial infection. Favor viral infection     2. Abdominal Pain.  - Initially epigastric. No emesis/loose stools.  - CT A/P did not show any acute process. He was evaluated by GI. No specific focal symptoms.       3. Weakness of LE's. 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  Neurology following. On IVIG per neurology. Has weakness of lower extremities; feels that is improved today and primarily noted in B hip flexors. T-spine and L-spine MRI shows no pathology to explain sx.  EMG ordered      4.  CP - pleuritic.  ? Pericarditis or pleurisy related to fever and potential viral infection.  Sx not c/w ACS and troponin negative     5.  Weight gain:  Suspect some degree of volume overload.  Will continue to diurese with Lasix 20 mg IV q12h for now      DVT Prophylaxis:  -  PCD's.  Code Status: Full Code  Discharge Dispo: TBD.  Estimated Disch Date /  "# of Days until Discharge: 1-2 more days, assuming continued improvement in fevers and weakness and when OK to d/c with ID            Interval History (Subjective):      Having pleuritc CP across both sides of his chest with cough and deep breathing.                    Physical Exam:      Last Vital Signs:  /69 (BP Location: Right arm)  Pulse 86  Temp 97.7  F (36.5  C) (Oral)  Resp 16  Ht 1.753 m (5' 9\")  Wt 109.2 kg (240 lb 12.8 oz)  SpO2 97%  BMI 35.56 kg/m2    No intake or output data in the 24 hours ending 06/27/17 1343    Constitutional: Awake, alert, cooperative, no apparent distress   Respiratory: Clear to auscultation bilaterally, no crackles or wheezing   Cardiovascular: Regular rate and rhythm, normal S1 and S2, and no murmur noted   Abdomen: Normal bowel sounds, soft, non-distended, non-tender   Skin: No rashes, no cyanosis, dry to touch   Neuro: Alert and oriented x3, no weakness, numbness, memory loss   Extremities: No edema, normal range of motion   Other(s):        All other systems: Negative          Medications:      All current medications were reviewed with changes reflected in problem list.         Data:      All new lab and imaging data was reviewed.   Labs:    Recent Labs  Lab 06/27/17  0650 06/26/17  0607   WBC  --  11.8*   HGB  --  11.5*   HCT  --  34.8*   MCV  --  84    208      Imaging:   No results found for this or any previous visit (from the past 24 hour(s)).   "

## 2017-06-27 NOTE — PROGRESS NOTES
"[facility]  Infectious Disease Progress Note          Assessment and Plan:   IMP 1 37 yo male 2 week illness, fever, HA, malise, weakness, doubt bacterial ? Viral, eosinophilia of sig note, ? GBS  2 Fever, essentially FUO, ALMANZA neg, ? resolving  3 Eosinophilia, ??    REc  1no antibioitics, await cxs and serologies  2 LP wo sign CNS infection  2 Neuro tx , weakness improved        Interval History:   no new complaints and stable; reviewed all in detail, notable eosinophilia initial 3000 eos, cxs all pending  WBC 11 K now  Weakness better, not spreading  T down for 36 hrs              Medications:       potassium chloride  20 mEq Oral BID     furosemide  20 mg Intravenous Q12H     sodium chloride (PF)  3 mL Intracatheter Q8H     ranitidine  150 mg Oral BID                  Physical Exam:   Blood pressure 130/69, pulse 86, temperature 97.7  F (36.5  C), temperature source Oral, resp. rate 16, height 1.753 m (5' 9\"), weight 109.2 kg (240 lb 12.8 oz), SpO2 97 %.  Wt Readings from Last 2 Encounters:   06/27/17 109.2 kg (240 lb 12.8 oz)     Vital Signs with Ranges  Temp:  [97.1  F (36.2  C)-98.9  F (37.2  C)] 97.7  F (36.5  C)  Heart Rate:  [76-92] 80  Resp:  [16-18] 16  BP: ()/(61-69) 130/69  SpO2:  [94 %-97 %] 97 %    Constitutional: Awake, alert, cooperative, no apparent distress   Lungs: Clear to auscultation bilaterally, no crackles or wheezing   Cardiovascular: Regular rate and rhythm, normal S1 and S2, and no murmur noted   Abdomen: Normal bowel sounds, soft, non-distended, non-tender   Skin: No rashes, no cyanosis, no edema   Other:           Data:   All microbiology laboratory data reviewed.  Recent Labs   Lab Test  06/27/17   0650  06/26/17   0607  06/24/17   0703  06/23/17   1150  06/23/17   0230   WBC   --   11.8*  21.5*  20.5*  15.9*   HGB   --   11.5*  17.3   --   16.3   HCT   --   34.8*  52.2   --   49.4   MCV   --   84  85   --   85   PLT  212  208  254   --   207     Recent Labs "   Lab Test  06/27/17   0650  06/26/17   0607  06/24/17   0703   CR  0.88  0.82  1.10     Recent Labs   Lab Test  06/23/17   1150   SED  4     Recent Labs   Lab Test  06/24/17   1820  06/24/17   1812  06/24/17   1435  06/22/17   1910  06/22/17   1835   CULT  No growth after 3 days  No growth after 3 days  Culture negative monitoring continues  No growth after 5 days  No growth after 5 days

## 2017-06-28 ENCOUNTER — APPOINTMENT (OUTPATIENT)
Dept: GENERAL RADIOLOGY | Facility: CLINIC | Age: 37
End: 2017-06-28
Attending: INTERNAL MEDICINE
Payer: MEDICAID

## 2017-06-28 LAB
BACTERIA SPEC CULT: NO GROWTH
BACTERIA SPEC CULT: NO GROWTH
BASOPHILS # BLD AUTO: 0.1 10E9/L (ref 0–0.2)
BASOPHILS NFR BLD AUTO: 0.3 %
DIFFERENTIAL METHOD BLD: ABNORMAL
EOSINOPHIL # BLD AUTO: 12.5 10E9/L (ref 0–0.7)
EOSINOPHIL NFR BLD AUTO: 58 %
ERYTHROCYTE [DISTWIDTH] IN BLOOD BY AUTOMATED COUNT: 13.8 % (ref 10–15)
HCT VFR BLD AUTO: 34.6 % (ref 40–53)
HGB BLD-MCNC: 11.3 G/DL (ref 13.3–17.7)
IMM GRANULOCYTES # BLD: 0.6 10E9/L (ref 0–0.4)
IMM GRANULOCYTES NFR BLD: 2.9 %
LYMPHOCYTES # BLD AUTO: 1.6 10E9/L (ref 0.8–5.3)
LYMPHOCYTES NFR BLD AUTO: 7.2 %
Lab: NORMAL
Lab: NORMAL
MAGNESIUM SERPL-MCNC: 2.3 MG/DL (ref 1.6–2.3)
MCH RBC QN AUTO: 27.6 PG (ref 26.5–33)
MCHC RBC AUTO-ENTMCNC: 32.7 G/DL (ref 31.5–36.5)
MCV RBC AUTO: 85 FL (ref 78–100)
MICRO REPORT STATUS: NORMAL
MICRO REPORT STATUS: NORMAL
MONOCYTES # BLD AUTO: 0.8 10E9/L (ref 0–1.3)
MONOCYTES NFR BLD AUTO: 3.6 %
NEUTROPHILS # BLD AUTO: 6 10E9/L (ref 1.6–8.3)
NEUTROPHILS NFR BLD AUTO: 28 %
NRBC # BLD AUTO: 0 10*3/UL
NRBC BLD AUTO-RTO: 0 /100
PLATELET # BLD AUTO: 251 10E9/L (ref 150–450)
POTASSIUM SERPL-SCNC: 3.7 MMOL/L (ref 3.4–5.3)
RBC # BLD AUTO: 4.09 10E12/L (ref 4.4–5.9)
SPECIMEN SOURCE: NORMAL
SPECIMEN SOURCE: NORMAL
WBC # BLD AUTO: 21.6 10E9/L (ref 4–11)
WNV IGG CSF-ACNC: NORMAL
WNV IGM CSF-ACNC: NORMAL

## 2017-06-28 PROCEDURE — 25000132 ZZH RX MED GY IP 250 OP 250 PS 637: Performed by: INTERNAL MEDICINE

## 2017-06-28 PROCEDURE — 12000007 ZZH R&B INTERMEDIATE

## 2017-06-28 PROCEDURE — 25000128 H RX IP 250 OP 636: Performed by: INTERNAL MEDICINE

## 2017-06-28 PROCEDURE — 25500064 ZZH RX 255 OP 636: Performed by: PHYSICIAN ASSISTANT

## 2017-06-28 PROCEDURE — 99233 SBSQ HOSP IP/OBS HIGH 50: CPT | Performed by: INTERNAL MEDICINE

## 2017-06-28 PROCEDURE — 93005 ELECTROCARDIOGRAM TRACING: CPT

## 2017-06-28 PROCEDURE — 84132 ASSAY OF SERUM POTASSIUM: CPT | Performed by: INTERNAL MEDICINE

## 2017-06-28 PROCEDURE — 36415 COLL VENOUS BLD VENIPUNCTURE: CPT | Performed by: INTERNAL MEDICINE

## 2017-06-28 PROCEDURE — 84311 SPECTROPHOTOMETRY: CPT | Performed by: INTERNAL MEDICINE

## 2017-06-28 PROCEDURE — 40000275 ZZH STATISTIC RCP TIME EA 10 MIN

## 2017-06-28 PROCEDURE — 84100 ASSAY OF PHOSPHORUS: CPT | Performed by: INTERNAL MEDICINE

## 2017-06-28 PROCEDURE — 62273 INJECT EPIDURAL PATCH: CPT

## 2017-06-28 PROCEDURE — 83735 ASSAY OF MAGNESIUM: CPT | Performed by: INTERNAL MEDICINE

## 2017-06-28 PROCEDURE — 93010 ELECTROCARDIOGRAM REPORT: CPT | Performed by: INTERNAL MEDICINE

## 2017-06-28 PROCEDURE — 85025 COMPLETE CBC W/AUTO DIFF WBC: CPT | Performed by: INTERNAL MEDICINE

## 2017-06-28 RX ORDER — IOPAMIDOL 408 MG/ML
10 INJECTION, SOLUTION INTRATHECAL ONCE
Status: COMPLETED | OUTPATIENT
Start: 2017-06-28 | End: 2017-06-28

## 2017-06-28 RX ORDER — LORAZEPAM 0.5 MG/1
.5-1 TABLET ORAL EVERY 8 HOURS PRN
Status: DISCONTINUED | OUTPATIENT
Start: 2017-06-28 | End: 2017-07-01 | Stop reason: HOSPADM

## 2017-06-28 RX ORDER — POTASSIUM CHLORIDE 1500 MG/1
20 TABLET, EXTENDED RELEASE ORAL 2 TIMES DAILY
Status: DISCONTINUED | OUTPATIENT
Start: 2017-06-28 | End: 2017-07-01 | Stop reason: HOSPADM

## 2017-06-28 RX ORDER — LIDOCAINE HYDROCHLORIDE 10 MG/ML
5 INJECTION, SOLUTION EPIDURAL; INFILTRATION; INTRACAUDAL; PERINEURAL ONCE
Status: COMPLETED | OUTPATIENT
Start: 2017-06-28 | End: 2017-06-28

## 2017-06-28 RX ADMIN — LORAZEPAM 1 MG: 0.5 TABLET ORAL at 23:11

## 2017-06-28 RX ADMIN — HYDROMORPHONE HYDROCHLORIDE 0.5 MG: 1 INJECTION, SOLUTION INTRAMUSCULAR; INTRAVENOUS; SUBCUTANEOUS at 15:55

## 2017-06-28 RX ADMIN — HYDROMORPHONE HYDROCHLORIDE 0.5 MG: 1 INJECTION, SOLUTION INTRAMUSCULAR; INTRAVENOUS; SUBCUTANEOUS at 12:21

## 2017-06-28 RX ADMIN — ONDANSETRON 4 MG: 2 INJECTION INTRAMUSCULAR; INTRAVENOUS at 09:15

## 2017-06-28 RX ADMIN — IBUPROFEN 600 MG: 600 TABLET ORAL at 03:55

## 2017-06-28 RX ADMIN — LIDOCAINE HYDROCHLORIDE 4 ML: 10 INJECTION, SOLUTION EPIDURAL; INFILTRATION; INTRACAUDAL; PERINEURAL at 15:20

## 2017-06-28 RX ADMIN — HYDROMORPHONE HYDROCHLORIDE 0.5 MG: 1 INJECTION, SOLUTION INTRAMUSCULAR; INTRAVENOUS; SUBCUTANEOUS at 23:11

## 2017-06-28 RX ADMIN — HYDROMORPHONE HYDROCHLORIDE 0.5 MG: 1 INJECTION, SOLUTION INTRAMUSCULAR; INTRAVENOUS; SUBCUTANEOUS at 04:21

## 2017-06-28 RX ADMIN — RANITIDINE 150 MG: 150 TABLET ORAL at 08:19

## 2017-06-28 RX ADMIN — IOPAMIDOL 4 ML: 408 INJECTION, SOLUTION INTRATHECAL at 15:19

## 2017-06-28 RX ADMIN — HYDROMORPHONE HYDROCHLORIDE 0.5 MG: 1 INJECTION, SOLUTION INTRAMUSCULAR; INTRAVENOUS; SUBCUTANEOUS at 18:06

## 2017-06-28 RX ADMIN — POTASSIUM CHLORIDE 20 MEQ: 1500 TABLET, EXTENDED RELEASE ORAL at 22:24

## 2017-06-28 RX ADMIN — POTASSIUM CHLORIDE 20 MEQ: 1.5 POWDER, FOR SOLUTION ORAL at 08:19

## 2017-06-28 RX ADMIN — FUROSEMIDE 20 MG: 10 INJECTION, SOLUTION INTRAVENOUS at 12:17

## 2017-06-28 RX ADMIN — HYDROMORPHONE HYDROCHLORIDE 0.5 MG: 1 INJECTION, SOLUTION INTRAMUSCULAR; INTRAVENOUS; SUBCUTANEOUS at 02:12

## 2017-06-28 RX ADMIN — RANITIDINE 150 MG: 150 TABLET ORAL at 20:42

## 2017-06-28 ASSESSMENT — PAIN DESCRIPTION - DESCRIPTORS: DESCRIPTORS: PRESSURE

## 2017-06-28 NOTE — PROGRESS NOTES
Pt to Radiology for blood patch procedure. #20 g angio started in left antecubital as his existing IV was not sufficient to draw blood back. Pt tolerated procedure well, 19cc of pt blood was injected to the Epidural space to relieve spinal headache symptoms. Instructions reviewed and placed on chart.

## 2017-06-28 NOTE — PROGRESS NOTES
RADIOLOGY PROCEDURE NOTE  Patient name: René Grigsby  MRN: [id number]  : 1980    Pre-procedure diagnosis: Spinal headache. Previous LP  Post-procedure diagnosis: Same    Procedure Date/Time: 2017  2:54 PM  Procedure: LP at L2-L3  Estimated blood loss: none  Specimen(s) collected with description: none  The patient tolerated the procedure well with no immediate complications.  Significant findings:none    See imaging dictation for procedural details.    Provider name: Kole Magallanes  Assistant(s):None

## 2017-06-28 NOTE — PROGRESS NOTES
"  [facility]  Hospitalist Progress Note  Steve Morris MD 06/28/2017    Reason for Stay (Diagnosis): fever, leukocytosis, eosinophilia          Assessment and Plan:      Summary of Stay: René Grigsby is a 36 year old male admitted on 6/22/2017 with fever and abdominal pain. Pt states since Tuesday he has been having sharp abdominal pain that comes and goes with some nausea. No emesis or loose stools. Also has had fevers and weakness of LE's and was treated for possible GBS with IVIG.  no bacterial source for infection has been found despite extensive eval including thoracic and lumbar spine MRI, chest/abd/pelvis CT, blood cx, lumbar puncture.  procal level normal.  eosinophilia on periph smear - cause unclear.  Remains off antibiotics with improvement in fever curve past 24-48 hours.    LE weakness has improved/resolved and no e/o GBS on EMG from 6/27    Course further complicated by development of HA yesterday - initially noticed after he was sitting up.  He felt a \"pop\" in his low back at site of previous LP and developed HA thereafter      Problem List:       1. Fever/FUO.  - unclear cause; spontaneously resolved past 24-48 hours  - Cx NGTD.  - no infection found despite extensive eval above.    - persistent eosinophilia noted  - appreciate ID input      2. Abdominal Pain.  - Initially epigastric. No emesis/loose stools.  - CT A/P did not show any acute process. He was evaluated by GI. No specific focal symptoms. Improved  - porphyria labs ordered and pending (seems unlikely dx but will need follow up on results)      3. Weakness of LE's. Resolved.  EMG done and normal per neurology.  Completed IVIG therapy for initial concerns re: GBS      4.  CP - clearly pleuritic.  ? Pericarditis or pleurisy related to fever and potential viral infection.  Sx not c/w ACS and troponin negative.  Chest CT neg.        5.  Weight gain:  Suspect volume overload.  Will continue to diurese with Lasix 20 mg IV " "q12h for now.  Ins/outs not accurate, but weight decreasing c/w diuresis    6.  Leukocytosis with persistent eosinophilia:  Unclear etiology.  D/w heme today and will place formal consult for them to assess.  ? Bone marrow process.  periph smear shows no immature forms    7.  Headache:  Suspect may have post-LP HA.  Blood patch through IR ordered today      DVT Prophylaxis:  -  PCD's.  Code Status: Full Code  Discharge Dispo: home  Estimated Disch Date / # of Days until Discharge: unclear; work up ongoing         Interval History (Subjective):      development of HA yesterday - initially noticed after he was sitting up.  He felt a \"pop\" in his low back at site of previous LP and developed HA thereafter                  Physical Exam:      Last Vital Signs:  /74 (BP Location: Right arm)  Pulse 63  Temp 96.9  F (36.1  C) (Oral)  Resp 16  Ht 1.753 m (5' 9\")  Wt 107.3 kg (236 lb 8 oz)  SpO2 97%  BMI 34.92 kg/m2      Intake/Output Summary (Last 24 hours) at 06/28/17 1215  Last data filed at 06/27/17 1927   Gross per 24 hour   Intake              249 ml   Output                0 ml   Net              249 ml       Constitutional: Awake, alert, cooperative, no apparent distress   Respiratory: Clear to auscultation bilaterally, no crackles or wheezing   Cardiovascular: Regular rate and rhythm, normal S1 and S2, and no murmur noted   Abdomen: Normal bowel sounds, soft, non-distended, non-tender   Skin: No rashes, no cyanosis, dry to touch   Neuro: Alert and oriented x3, no weakness, numbness, memory loss   Extremities: No edema, normal range of motion   Other(s):        All other systems: Negative          Medications:      All current medications were reviewed with changes reflected in problem list.         Data:      All new lab and imaging data was reviewed.   Labs:    Recent Labs  Lab 06/28/17  0640   WBC 21.6*   HGB 11.3*   HCT 34.6*   MCV 85         Imaging:   No results found for this or any " previous visit (from the past 24 hour(s)).

## 2017-06-28 NOTE — PROGRESS NOTES
"[facility]  Infectious Disease Progress Note          Assessment and Plan:   IMP 1 37 yo male 2 week illness, fever, HA, malise, weakness, doubt bacterial ? Viral, eosinophilia of sig note, ? GBS  2 Fever, essentially FUO, ALMANZA neg, ? resolving  3 Eosinophilia, ??    REc  1no antibioitics, await cxs and serologies  2 LP wo sign CNS infection, now patch  3 Neuro tx , weakness improved  4 Eosinophilia almost certain significant, doubt infection        Interval History:   no new complaints and stable; reviewed all in detail, notable eosinophilia initial 3000 eos, cxs all pending  WBC 20 K now with 12 K eos  Weakness better, not spreading  T down for 60 hrs              Medications:       potassium chloride  20 mEq Oral BID     furosemide  20 mg Intravenous Q12H     sodium chloride (PF)  3 mL Intracatheter Q8H     ranitidine  150 mg Oral BID                  Physical Exam:   Blood pressure 129/78, pulse 63, temperature 96.7  F (35.9  C), temperature source Oral, resp. rate 20, height 1.753 m (5' 9\"), weight 107.3 kg (236 lb 8 oz), SpO2 98 %.  Wt Readings from Last 2 Encounters:   06/28/17 107.3 kg (236 lb 8 oz)     Vital Signs with Ranges  Temp:  [96.2  F (35.7  C)-98.2  F (36.8  C)] 96.7  F (35.9  C)  Pulse:  [63] 63  Heart Rate:  [55-78] 78  Resp:  [16-20] 20  BP: (120-136)/(66-82) 129/78  SpO2:  [95 %-100 %] 98 %    Constitutional: Awake, alert, cooperative, no apparent distress   Lungs: Clear to auscultation bilaterally, no crackles or wheezing   Cardiovascular: Regular rate and rhythm, normal S1 and S2, and no murmur noted   Abdomen: Normal bowel sounds, soft, non-distended, non-tender   Skin: No rashes, no cyanosis, no edema   Other:           Data:   All microbiology laboratory data reviewed.  Recent Labs   Lab Test  06/28/17   0640  06/27/17   0650  06/26/17   0607  06/24/17   0703   WBC  21.6*   --   11.8*  21.5*   HGB  11.3*   --   11.5*  17.3   HCT  34.6*   --   34.8*  52.2   MCV  85   " --   84  85   PLT  251  212  208  254     Recent Labs   Lab Test  06/27/17   0650  06/26/17   0607  06/24/17   0703   CR  0.88  0.82  1.10     Recent Labs   Lab Test  06/23/17   1150   SED  4     Recent Labs   Lab Test  06/24/17   1820  06/24/17   1812  06/24/17   1435  06/22/17   1910  06/22/17   1835   CULT  No growth after 4 days  No growth after 4 days  Culture negative monitoring continues  No growth  No growth

## 2017-06-28 NOTE — PROGRESS NOTES
Hospitalist MD web paged that Pt states that it feels like day one with severe headache and hurts all over.  WBC count doubled.  Lt's family in room FYI.  Hospitalist web paged thqt Pt now having emesis of yellow stomach contents.  Temperature 95.9.  Pt reports small amount of nosebleed blowing his nose. IV Zofran given for nausea.  Hospitalist MD web paged that Pt would like to speak to MD with family in room FYI.

## 2017-06-28 NOTE — PLAN OF CARE
Problem: Infection, Risk/Actual (Adult)  Goal: Identify Related Risk Factors and Signs and Symptoms  Related risk factors and signs and symptoms are identified upon initiation of Human Response Clinical Practice Guideline (CPG)     06/28/17 0731   Infection, Risk/Actual   Infection, Risk/Actual: Related Risk Factors prolonged hospitalization   Signs and Symptoms (Infection, Risk/Actual) edema;pain   [id number]: Tele: SR. Pt C/O severe headache following receiving Lasix, received Hydromorophone w/decrease in pain. Pt C/O chest pain, refer to this RN's note for details. Physical assessment per Adult PCS. Refer to VS, I/O, Adult PCS for full assessment & shift details. Disposition pending at this time.  Rhonda Alcantara, RN, BSN  Medical/Telemetry - 3

## 2017-06-28 NOTE — PLAN OF CARE
Problem: Goal Outcome Summary  Goal: Goal Outcome Summary  Outcome: No Change  Pt alert and oriented.  Up with [facility] staff.  Dilaudid 0.5 mg IV given for severe headache today with some relief.  Pt received a blood patch, states headache better after receiving patch.  Telemetry SR with slight ST depression.  WBC 21.6.  To have hematology consult.  Tolerating small amount of low fiber diet po.  Potassium level 3.5, Phosphorous level 4.0.    Admitting Hospitalist web paged that Pt is asking to speak with MD about possible tooth infection that may be causing his elevated WBC.

## 2017-06-28 NOTE — PROGRESS NOTES
C/O chest pain, pain w/deep breath, entered request for STAT 12-lead EKG, VS: 122/71, 63, 99% RA, 96.5 (O), medicated w/Hydromorophone 0.5 mg IVP. Per pt pain starting to resolve after IVP Hydromorophone.  Rhonda Alcantara, RN, BSN  Medical/Telemetry - 3

## 2017-06-28 NOTE — CONSULTS
"[facility]  Hematology Consultation      René Grigsby MRN# [id number]   YOB: 1980 Age: 36 year old   Date of Admission: 6/22/2017     Reason for consult: René Grigsby is a 36 year old male who is referred by Dr. Morris for evaluation of his fever and eosinophilia.          History of Present Illness:   The patient has been in good health with no prior medical problems. He developed a fever, nausea, vomiting, and upper abdominal pain a few days prior to admission, and he was admitted on 6/22/17 for management of a possible gastroenteritis. He had persistent fevers for a few days, but his fever and abdominal pain have recently improved. Labs on admission revealed moderate leukocytosis with a slight increase in eosinophils, and over the past several days his leukocytosis has persisted, while his eosinophils have increased from 7% to 58% today. His cultures have remained negative and a source of infection has not been identified. He has not required antibiotics. CT scans of the chest and abdomen last week revealed no significant abnormalities.            Review of Systems:   The 10 point Review of Systems is negative other than noted in the HPI.  Specifically, no recent history of weight loss, night sweats, arthritis, or skin rashes.               Past Medical History:   None          Past Surgical History:   S/P vasectomy            Medications:       potassium chloride  20 mEq Oral BID     furosemide  20 mg Intravenous Q12H     sodium chloride (PF)  3 mL Intracatheter Q8H     ranitidine  150 mg Oral BID            Social History:   Reviewed. He is a former smoker, quitting 3 years ago.          Family History:   History of coronary artery disease in his father, grandfather, and 4 uncles.          Physical Exam:   Blood pressure 129/74, pulse 63, temperature 98  F (36.7  C), temperature source Oral, resp. rate 20, height 1.753 m (5' 9\"), weight 107.3 kg (236 lb 8 oz), SpO2 97 " %.  Generally, the patient appeared sedated and in no acute distress.  HEENT:  Sclerae were anicteric, and conjunctivae were pink.  Ears and nose were unremarkable.  Oral cavity revealed no lesions.  Neck:  No palpable masses or thyromegaly.    Lymph:  No cervical or supraclavicular lymph nodes.  Lungs:  Clear to auscultation and percussion.  Heart:  No murmurs or gallops.  Abdomen:  Soft and slightly tender in the epigastrium, without hepatosplenomegaly or abdominal masses.  Extremities:  No edema or tenderness.  Skin:  No rashes or palpable lesions.  Neuro:  Non-focal.         Data:   All laboratory data reviewed.  Lab Results   Component Value Date    WBC 21.6 (H) 06/28/2017    HGB 11.3 (L) 06/28/2017    HCT 34.6 (L) 06/28/2017    MCV 85 06/28/2017     06/28/2017     Lab Results   Component Value Date    CR 0.88 06/27/2017    AST 8 06/23/2017    ALT 13 06/23/2017    ALKPHOS 40 06/23/2017    BILITOTAL 0.6 06/23/2017      Peripheral blood morphology:   - Leukocytosis, marked, with absolute neutrophilia and absolute eosinophilia.   - Neutrophils with toxic changes.   - Negative for significant left shift, monocytosis, basophilia, dysplastic changes and blasts.      COMMENT:   Rule out infectious/inflammatory process.  If leukocytosis persists,   a myeloid neoplastic process should be excluded.   The differential diagnosis of eosinophilia includes parasitic   infestation, medication reactions, hypersensitivity reactions,   vasculitis.  Correlation with clinical findings is recommended.      Blood and CSF cultures remain negative.     CT abdomen:  1. Trace bilateral pleural effusions and trace pericardial effusion.  2. The appendix is at the upper limits of normal in size. No  surrounding inflammation and no convincing evidence of appendicitis.  3. No other acute abnormality. No bowel obstruction or inflammation.     CT chest:  1. No evidence for pulmonary embolism or thoracic aortic dissection.    2. Small  bilateral pleural effusions.            Assessment and Plan:   36 year old previously healthy male with an acute febrile illness associated with abdominal pain, nausea, and leukocytosis, now with progressive peripheral eosinophilia over the past few days. Possible causes of eosinophilia include malignancies, allergies, drug reactions, asthma, skin inflammatory rashes, autoimmune disease, parasitic infection, or an underlying myeloproliferative disorder. The rapid rise in his peripheral eosinophils suggests that this may be a reactive phenomenon rather than an underlying bone marrow disorder, likely related to his recent febrile illness. Will check a serum TORITO to screen for autoimmune disease, and follow his CBC and diff now that his fever has resolved. His blood counts could be rechecked 1-2 weeks after discharge, and if his eosinophilia persists after recovery from his acute illness, a bone marrow biopsy could be consider.      Discussed with the patient, and I will follow up over the next couple days to recheck labs. Thank you for allowing me to assist in his care.       Lincoln Mccullough M.D.  [facility]  6/28/2017   6:00 PM

## 2017-06-29 LAB
ANION GAP SERPL CALCULATED.3IONS-SCNC: 4 MMOL/L (ref 3–14)
BACTERIA SPEC CULT: NO GROWTH
BASOPHILS # BLD AUTO: 0 10E9/L (ref 0–0.2)
BASOPHILS NFR BLD AUTO: 0 %
BUN SERPL-MCNC: 11 MG/DL (ref 7–30)
CALCIUM SERPL-MCNC: 8.1 MG/DL (ref 8.5–10.1)
CHLORIDE SERPL-SCNC: 104 MMOL/L (ref 94–109)
CO2 SERPL-SCNC: 30 MMOL/L (ref 20–32)
COPATH REPORT: NORMAL
CREAT SERPL-MCNC: 0.94 MG/DL (ref 0.66–1.25)
DIFFERENTIAL METHOD BLD: ABNORMAL
EOSINOPHIL # BLD AUTO: 18.8 10E9/L (ref 0–0.7)
EOSINOPHIL NFR BLD AUTO: 75 %
ERYTHROCYTE [DISTWIDTH] IN BLOOD BY AUTOMATED COUNT: 13.4 % (ref 10–15)
GFR SERPL CREATININE-BSD FRML MDRD: ABNORMAL ML/MIN/1.7M2
GLUCOSE SERPL-MCNC: 87 MG/DL (ref 70–99)
HCT VFR BLD AUTO: 39 % (ref 40–53)
HGB BLD-MCNC: 13 G/DL (ref 13.3–17.7)
LDH SERPL L TO P-CCNC: 385 U/L (ref 85–227)
LYMPHOCYTES # BLD AUTO: 1.5 10E9/L (ref 0.8–5.3)
LYMPHOCYTES NFR BLD AUTO: 6 %
MCH RBC QN AUTO: 28 PG (ref 26.5–33)
MCHC RBC AUTO-ENTMCNC: 33.3 G/DL (ref 31.5–36.5)
MCV RBC AUTO: 84 FL (ref 78–100)
METAMYELOCYTES # BLD: 0.8 10E9/L
METAMYELOCYTES NFR BLD MANUAL: 3 %
MICRO REPORT STATUS: NORMAL
MONOCYTES # BLD AUTO: 0.3 10E9/L (ref 0–1.3)
MONOCYTES NFR BLD AUTO: 1 %
MYELOCYTES # BLD: 0.3 10E9/L
MYELOCYTES NFR BLD MANUAL: 1 %
NEUTROPHILS # BLD AUTO: 3.5 10E9/L (ref 1.6–8.3)
NEUTROPHILS NFR BLD AUTO: 14 %
PHOSPHATE SERPL-MCNC: 4.5 MG/DL (ref 2.5–4.5)
PLATELET # BLD AUTO: 286 10E9/L (ref 150–450)
PLATELET # BLD EST: NORMAL 10*3/UL
POTASSIUM SERPL-SCNC: 4.2 MMOL/L (ref 3.4–5.3)
RBC # BLD AUTO: 4.65 10E12/L (ref 4.4–5.9)
RBC MORPH BLD: ABNORMAL
RETICS # AUTO: 70.1 10E9/L (ref 25–95)
RETICS/RBC NFR AUTO: 1.5 % (ref 0.5–2)
SODIUM SERPL-SCNC: 138 MMOL/L (ref 133–144)
SPECIMEN SOURCE: NORMAL
VARIANT LYMPHS BLD QL SMEAR: PRESENT
WBC # BLD AUTO: 25 10E9/L (ref 4–11)

## 2017-06-29 PROCEDURE — 36415 COLL VENOUS BLD VENIPUNCTURE: CPT | Performed by: INTERNAL MEDICINE

## 2017-06-29 PROCEDURE — 85060 BLOOD SMEAR INTERPRETATION: CPT | Performed by: INTERNAL MEDICINE

## 2017-06-29 PROCEDURE — 25000132 ZZH RX MED GY IP 250 OP 250 PS 637: Performed by: INTERNAL MEDICINE

## 2017-06-29 PROCEDURE — 40000847 ZZHCL STATISTIC MORPHOLOGY W/INTERP HISTOLOGY TC 85060: Performed by: INTERNAL MEDICINE

## 2017-06-29 PROCEDURE — 85045 AUTOMATED RETICULOCYTE COUNT: CPT | Performed by: INTERNAL MEDICINE

## 2017-06-29 PROCEDURE — 12000007 ZZH R&B INTERMEDIATE

## 2017-06-29 PROCEDURE — 85025 COMPLETE CBC W/AUTO DIFF WBC: CPT | Performed by: INTERNAL MEDICINE

## 2017-06-29 PROCEDURE — 86038 ANTINUCLEAR ANTIBODIES: CPT | Performed by: INTERNAL MEDICINE

## 2017-06-29 PROCEDURE — 25000128 H RX IP 250 OP 636: Performed by: INTERNAL MEDICINE

## 2017-06-29 PROCEDURE — 87449 NOS EACH ORGANISM AG IA: CPT | Performed by: INTERNAL MEDICINE

## 2017-06-29 PROCEDURE — 99207 ZZC CDG-MDM COMPONENT: MEETS MODERATE - DOWN CODED: CPT | Performed by: INTERNAL MEDICINE

## 2017-06-29 PROCEDURE — 83615 LACTATE (LD) (LDH) ENZYME: CPT | Performed by: INTERNAL MEDICINE

## 2017-06-29 PROCEDURE — 80048 BASIC METABOLIC PNL TOTAL CA: CPT | Performed by: INTERNAL MEDICINE

## 2017-06-29 PROCEDURE — 99232 SBSQ HOSP IP/OBS MODERATE 35: CPT | Performed by: INTERNAL MEDICINE

## 2017-06-29 RX ADMIN — POTASSIUM CHLORIDE 20 MEQ: 1500 TABLET, EXTENDED RELEASE ORAL at 20:09

## 2017-06-29 RX ADMIN — HYDROMORPHONE HYDROCHLORIDE 0.5 MG: 1 INJECTION, SOLUTION INTRAMUSCULAR; INTRAVENOUS; SUBCUTANEOUS at 22:44

## 2017-06-29 RX ADMIN — OXYCODONE HYDROCHLORIDE 10 MG: 5 TABLET ORAL at 05:04

## 2017-06-29 RX ADMIN — RANITIDINE 150 MG: 150 TABLET ORAL at 20:09

## 2017-06-29 RX ADMIN — ACETAMINOPHEN 650 MG: 325 TABLET, FILM COATED ORAL at 05:04

## 2017-06-29 RX ADMIN — RANITIDINE 150 MG: 150 TABLET ORAL at 10:04

## 2017-06-29 RX ADMIN — HYDROMORPHONE HYDROCHLORIDE 0.5 MG: 1 INJECTION, SOLUTION INTRAMUSCULAR; INTRAVENOUS; SUBCUTANEOUS at 06:55

## 2017-06-29 RX ADMIN — POTASSIUM CHLORIDE 20 MEQ: 1500 TABLET, EXTENDED RELEASE ORAL at 10:04

## 2017-06-29 RX ADMIN — HYDROMORPHONE HYDROCHLORIDE 0.5 MG: 1 INJECTION, SOLUTION INTRAMUSCULAR; INTRAVENOUS; SUBCUTANEOUS at 13:07

## 2017-06-29 RX ADMIN — HYDROMORPHONE HYDROCHLORIDE 0.5 MG: 1 INJECTION, SOLUTION INTRAMUSCULAR; INTRAVENOUS; SUBCUTANEOUS at 03:37

## 2017-06-29 RX ADMIN — LORAZEPAM 1 MG: 0.5 TABLET ORAL at 22:43

## 2017-06-29 NOTE — PROGRESS NOTES
[facility]  Hospitalist Progress Note  Teresita Chavis MD 06/29/2017    Reason for Stay (Diagnosis): FUO         Assessment and Plan:      Summary of Stay: René Grigsby is a 36 year old otherwise healthy male  admitted on 6/22/2017 with fever/abdominal pain/bilateral le weakness.      Given the ascending nature of his weakness in the context of an acute febrile illness, neuro consulted and recommended treatment for GBS with IVIG, those symptoms have resolved.     He has not received any abx.  Evaluation for fever has included CT Chest/abdomen/pelvis only + for small bilateral pleural effusions.  Lumbar puncture traumatic but not consistent with meningitis.  West Nile IgG and IgM not completed due to hemolysis. BCx and CSF culture remain without growth.  procalcitonin low. TSH slightly high at 4.19, Free T4 pending.     Interestingly his fever is improving but now has persistent and progressive leukocytosis with a predominance of eosinophils.   Problem List:   1. Fever:  Looks to be resolving, etiology remains unknown, I suspect viral-appreciate Dr. Baptiste input, cont with no abx  2. Leukocytosis with eosinophilia:  No recent travels, no allergy symptoms, works as a  and not sure if he's been exposed to mold.  Will check fungitel as screen for ? Fungal etiology.  Appreciate Dr. Mccullough's assistance  3. ? GBS:  With b le weakness on presentation, treated with IVIG/steroids as if GBS-resolved now ambulating without difficulty.  Of note EMG from 6/27 negative for GBS (? Timing issue).  Appreciate neuro input  4. Abdominal pain:  Initially epigastric, no emesis/diarrhea, evaluated by Dr. Arguello who felt this was most likely due to a viral syndrome, no further w/u indicated and symptoms improved  5. Transient CP-no e/o PE, neg troponin, resolved  6. Weight gain:  2/2 fluids-resolving with IV furosemide, will d/c sched bid IV dose  DVT Prophylaxis: Pneumatic Compression Devices  Code Status: Full  "Code  Discharge Dispo: home  Estimated Disch Date / # of Days until Disch: unclear        Interval History (Subjective):      Feeling tired today but denies any cp sob n/v/d, no abdominal pain, ambulating in halls last evening                  Physical Exam:      Last Vital Signs:  /84 (BP Location: Right arm)  Pulse 63  Temp 95.7  F (35.4  C) (Oral)  Resp 16  Ht 1.753 m (5' 9\")  Wt 107.3 kg (236 lb 8 oz)  SpO2 98%  BMI 34.92 kg/m2    Net neg 2 L    Pleasant nad looks stated age head nc/at sclera clear lungs ctab nl effort RRR no mrg no le edema skin w/d no c/c abd s/nt/nd alert and oriented affected area ambulating without difficulty wright            Medications:      All current medications were reviewed with changes reflected in problem list.         Data:      All new lab and imaging data was reviewed.   Labs:    Recent Labs  Lab 06/29/17  0658      POTASSIUM 4.2   CHLORIDE 104   CO2 30   ANIONGAP 4   GLC 87   BUN 11   CR 0.94   GFRESTIMATED >90Non  GFR Calc   GFRESTBLACK >90African American GFR Calc   YENNY 8.1*       Recent Labs  Lab 06/29/17  0658   WBC 25.0*   HGB 13.0*   HCT 39.0*   MCV 84         Imaging:           "

## 2017-06-29 NOTE — PROGRESS NOTES
"SPIRITUAL HEALTH SERVICES  SPIRITUAL ASSESSMENT Progress Note  [facility] Med/Surg     PRIMARY FOCUS:     Emotional/spiritual/Gnosticism distress    Support for coping    ILLNESS CIRCUMSTANCES:   Reviewed documentation. Reflective conversation shared with René and his wife, Ebony which integrated elements of illness and family narratives.     Context of Serious Illness/Symptom(s) - René stated that a lot of things brought him in to the hospital but that he is still here because his white blood cell count keeps going up. \"It doubled yesterday from the day before, and now it's gone up even more.\"    Persons/Resources Involved - His wife Ebony is his primary source of support.     DISTRESS:     Emotional/Existential/Relational Distress - none exprssed    Spiritual/Nondenominational Distress - none expressed     Social/Cultural/Economic Distress - none expressed    SPIRIT (Coping):     Uatsdin/Liudmila - Non-Buddhist and is very connected with his Baptism    Spiritual Practice(s) - Prayer and gladly welcomed prayer. Also has his Bible with him for reading.    Emotional/Existential/Relational Connections - Ebony: \"We've had a revolving door of people in here. Today's the quietest it's been, usually people start coming in at 7:30.\"    SENSE-MAKING:    Goals of Care - Waiting for white count to improve.    Meaning/Sense-Making - not stated    PLAN: [facility] will continue to be available per patient/family/staff request.      Benedicto Rogers  Chaplain Resident  Pager [phone]    "

## 2017-06-29 NOTE — PLAN OF CARE
Problem: Goal Outcome Summary  Goal: Goal Outcome Summary  Outcome: Improving  Pt alert and oriented.  Up independently in room tolerated well.  Receiving dilaudid 0.5 mg IV prn for low back pains with relief.  VSS.  Telemetry SR.  Potassium level 4.2, phosphorous level 4.5.  Pt to discharge to home when condition stable.

## 2017-06-29 NOTE — PROGRESS NOTES
Hematology Progress Note    Patient notes slight improvement in headache. Denies further abdominal pain. He notes that he has a large dental cavity on the right.     Remains afebrile past 24 hours.  Current alert, comfortable.     LABS today:    WBC 25.0 (H)   Hemoglobin 13.0 (L)   Hematocrit 39.0 (L)   Platelet Count 286   % Neutrophils 14.0   % Lymphocytes 6.0   % Monocytes 1.0   % Eosinophils 75.0   % Metamyelocytes 3.0   % Myelocytes 1.0     IMPRESSION:  Recent febrile illness and GI symptoms, clinically improved. He now has dramatic eosinophilia and intermediate immature myeloid cells in his peripheral blood. His blood morphology last week revealed mature eosinophils and no evidence of dysplasia, but will recheck his peripheral blood smear to look for evidence of an evolving bone marrow disorder. Still suspect that his blood changes are reactive and related to a systemic infection or other inflammatory process. Discussed with the patient.     Lincoln Mccullough M.D.  [facility]  6/29/2017   8:52 AM

## 2017-06-29 NOTE — PROGRESS NOTES
Chart reviewed.  Porhyrin levels pending.  Patient has eosinophilia and elevated white count.  Unknown etiology.  ID and heme onc following.  I will follow him peripherally.

## 2017-06-29 NOTE — PLAN OF CARE
Problem: Pain, Acute (Adult)  Goal: Identify Related Risk Factors and Signs and Symptoms  Related risk factors and signs and symptoms are identified upon initiation of Human Response Clinical Practice Guideline (CPG)   Outcome: No Change    06/29/17 0752   Pain, Acute   Related Risk Factors (Acute Pain) disease process;patient perception;persistent pain;psychosocial factor   Signs and Symptoms (Acute Pain) fatigue/weakness;moaning;sleep pattern alteration;social withdrawal   9570-7379: Tele: SB/SR. Physical assessment per Adult PCS. Bandaid to blood patch site mid lumbar region CDI & is area of pt C/O pain. Between 4536-0966 received hydromorophone 1.5 mg IVP, Oxycodone 10 mg, acetaminophen 650 mg, Lorazepam 1 mg PO. Pt requested when received Oxycodone @ 0504 that he receive the hydromorphone as soon as due. This RN entered pt room ~ 0550, said pt name Xs2 w/o response from pt. Pt put call light on @ 0652 to request pain medication. Pt's pain not relieved w/current pain medications provided, communication left for day rounding hospitalist for consideration of pain consult to assist w/pain management. Refer to VS, I/O, Adult PCS for full assessment & shift details. Disposition undetermined at this time.  Rhonda Alcantara, RN, BSN  Medical/Telemetry - 3

## 2017-06-30 ENCOUNTER — APPOINTMENT (OUTPATIENT)
Dept: CT IMAGING | Facility: CLINIC | Age: 37
End: 2017-06-30
Attending: INTERNAL MEDICINE
Payer: MEDICAID

## 2017-06-30 LAB
ANA SER QL IA: 1.9
ANION GAP SERPL CALCULATED.3IONS-SCNC: 5 MMOL/L (ref 3–14)
BACTERIA SPEC CULT: NO GROWTH
BACTERIA SPEC CULT: NO GROWTH
BASOPHILS # BLD AUTO: 0.3 10E9/L (ref 0–0.2)
BASOPHILS NFR BLD AUTO: 1 %
BUN SERPL-MCNC: 12 MG/DL (ref 7–30)
CALCIUM SERPL-MCNC: 8.2 MG/DL (ref 8.5–10.1)
CHLORIDE SERPL-SCNC: 105 MMOL/L (ref 94–109)
CO2 SERPL-SCNC: 29 MMOL/L (ref 20–32)
COLLECT DURATION TIME SPEC: ABNORMAL H
COPRO1/CREAT UR-SRTO: 4
COPRO3/CREAT UR-SRTO: 16
CREAT 24H UR-MCNC: 24 MG/DL
CREAT 24H UR-MRATE: ABNORMAL G/(24.H)
CREAT SERPL-MCNC: 1.04 MG/DL (ref 0.66–1.25)
CRP SERPL-MCNC: 17.8 MG/L (ref 0–8)
DIFFERENTIAL METHOD BLD: ABNORMAL
EOSINOPHIL # BLD AUTO: 14.7 10E9/L (ref 0–0.7)
EOSINOPHIL NFR BLD AUTO: 51 %
ERYTHROCYTE [DISTWIDTH] IN BLOOD BY AUTOMATED COUNT: 13.5 % (ref 10–15)
ERYTHROCYTE [SEDIMENTATION RATE] IN BLOOD BY WESTERGREN METHOD: 116 MM/H (ref 0–15)
GFR SERPL CREATININE-BSD FRML MDRD: 81 ML/MIN/1.7M2
GLUCOSE SERPL-MCNC: 94 MG/DL (ref 70–99)
HCT VFR BLD AUTO: 39.9 % (ref 40–53)
HEPTACARBOXYLATE/CREAT UR-SRTO: ABNORMAL
HGB BLD-MCNC: 13.5 G/DL (ref 13.3–17.7)
INTERPRETATION ECG - MUSE: NORMAL
LYMPHOCYTES # BLD AUTO: 4.6 10E9/L (ref 0.8–5.3)
LYMPHOCYTES NFR BLD AUTO: 16 %
Lab: NORMAL
Lab: NORMAL
MCH RBC QN AUTO: 28.2 PG (ref 26.5–33)
MCHC RBC AUTO-ENTMCNC: 33.8 G/DL (ref 31.5–36.5)
MCV RBC AUTO: 84 FL (ref 78–100)
METAMYELOCYTES # BLD: 1.2 10E9/L
METAMYELOCYTES NFR BLD MANUAL: 4 %
MICRO REPORT STATUS: NORMAL
MICRO REPORT STATUS: NORMAL
MONOCYTES # BLD AUTO: 0.3 10E9/L (ref 0–1.3)
MONOCYTES NFR BLD AUTO: 1 %
MYELOCYTES # BLD: 0.6 10E9/L
MYELOCYTES NFR BLD MANUAL: 2 %
NEUTROPHILS # BLD AUTO: 7.2 10E9/L (ref 1.6–8.3)
NEUTROPHILS NFR BLD AUTO: 25 %
PBG 24H UR-SRATE: ABNORMAL
PBG UR-SCNC: ABNORMAL UMOL/L
PHOSPHATE SERPL-MCNC: 3.5 MG/DL (ref 2.5–4.5)
PLATELET # BLD AUTO: 295 10E9/L (ref 150–450)
PLATELET # BLD EST: NORMAL 10*3/UL
PORPHYRIN FRACT 24H UR-IMP: ABNORMAL
POTASSIUM SERPL-SCNC: 4.2 MMOL/L (ref 3.4–5.3)
RBC # BLD AUTO: 4.78 10E12/L (ref 4.4–5.9)
RBC MORPH BLD: ABNORMAL
SODIUM SERPL-SCNC: 139 MMOL/L (ref 133–144)
SPECIMEN SOURCE: NORMAL
SPECIMEN SOURCE: NORMAL
SPECIMEN VOL ?TM UR: ABNORMAL ML
UROPOR/CREAT UR-SRTO: ABNORMAL
WBC # BLD AUTO: 28.8 10E9/L (ref 4–11)

## 2017-06-30 PROCEDURE — 84100 ASSAY OF PHOSPHORUS: CPT | Performed by: INTERNAL MEDICINE

## 2017-06-30 PROCEDURE — 25000132 ZZH RX MED GY IP 250 OP 250 PS 637: Performed by: INTERNAL MEDICINE

## 2017-06-30 PROCEDURE — 70487 CT MAXILLOFACIAL W/DYE: CPT

## 2017-06-30 PROCEDURE — 99232 SBSQ HOSP IP/OBS MODERATE 35: CPT | Performed by: INTERNAL MEDICINE

## 2017-06-30 PROCEDURE — 86140 C-REACTIVE PROTEIN: CPT | Performed by: INTERNAL MEDICINE

## 2017-06-30 PROCEDURE — 25000128 H RX IP 250 OP 636: Performed by: INTERNAL MEDICINE

## 2017-06-30 PROCEDURE — 12000007 ZZH R&B INTERMEDIATE

## 2017-06-30 PROCEDURE — 70486 CT MAXILLOFACIAL W/O DYE: CPT

## 2017-06-30 PROCEDURE — 86255 FLUORESCENT ANTIBODY SCREEN: CPT | Performed by: INTERNAL MEDICINE

## 2017-06-30 PROCEDURE — 36415 COLL VENOUS BLD VENIPUNCTURE: CPT | Performed by: INTERNAL MEDICINE

## 2017-06-30 PROCEDURE — 85652 RBC SED RATE AUTOMATED: CPT | Performed by: INTERNAL MEDICINE

## 2017-06-30 PROCEDURE — 84132 ASSAY OF SERUM POTASSIUM: CPT | Performed by: INTERNAL MEDICINE

## 2017-06-30 PROCEDURE — 86682 HELMINTH ANTIBODY: CPT | Performed by: INTERNAL MEDICINE

## 2017-06-30 PROCEDURE — 86665 EPSTEIN-BARR CAPSID VCA: CPT | Performed by: INTERNAL MEDICINE

## 2017-06-30 PROCEDURE — 80048 BASIC METABOLIC PNL TOTAL CA: CPT | Performed by: INTERNAL MEDICINE

## 2017-06-30 PROCEDURE — 99207 ZZC CDG-MDM COMPONENT: MEETS MODERATE - DOWN CODED: CPT | Performed by: INTERNAL MEDICINE

## 2017-06-30 PROCEDURE — 85025 COMPLETE CBC W/AUTO DIFF WBC: CPT | Performed by: INTERNAL MEDICINE

## 2017-06-30 RX ORDER — IOPAMIDOL 755 MG/ML
500 INJECTION, SOLUTION INTRAVASCULAR ONCE
Status: COMPLETED | OUTPATIENT
Start: 2017-06-30 | End: 2017-06-30

## 2017-06-30 RX ORDER — HYDROMORPHONE HYDROCHLORIDE 2 MG/1
2 TABLET ORAL EVERY 4 HOURS PRN
Status: DISCONTINUED | OUTPATIENT
Start: 2017-06-30 | End: 2017-07-01 | Stop reason: HOSPADM

## 2017-06-30 RX ADMIN — POTASSIUM CHLORIDE 20 MEQ: 1500 TABLET, EXTENDED RELEASE ORAL at 20:37

## 2017-06-30 RX ADMIN — HYDROMORPHONE HYDROCHLORIDE 2 MG: 2 TABLET ORAL at 19:30

## 2017-06-30 RX ADMIN — POTASSIUM CHLORIDE 20 MEQ: 1500 TABLET, EXTENDED RELEASE ORAL at 08:12

## 2017-06-30 RX ADMIN — LORAZEPAM 1 MG: 0.5 TABLET ORAL at 22:56

## 2017-06-30 RX ADMIN — HYDROMORPHONE HYDROCHLORIDE 0.5 MG: 1 INJECTION, SOLUTION INTRAMUSCULAR; INTRAVENOUS; SUBCUTANEOUS at 08:33

## 2017-06-30 RX ADMIN — HYDROMORPHONE HYDROCHLORIDE 0.5 MG: 1 INJECTION, SOLUTION INTRAMUSCULAR; INTRAVENOUS; SUBCUTANEOUS at 05:52

## 2017-06-30 RX ADMIN — RANITIDINE 150 MG: 150 TABLET ORAL at 08:12

## 2017-06-30 RX ADMIN — IBUPROFEN 600 MG: 600 TABLET ORAL at 15:18

## 2017-06-30 RX ADMIN — SENNOSIDES 8.6 MG: 8.6 TABLET, FILM COATED ORAL at 13:09

## 2017-06-30 RX ADMIN — IOPAMIDOL 100 ML: 755 INJECTION, SOLUTION INTRAVENOUS at 13:31

## 2017-06-30 RX ADMIN — SENNOSIDES 8.6 MG: 8.6 TABLET, FILM COATED ORAL at 22:15

## 2017-06-30 RX ADMIN — RANITIDINE 150 MG: 150 TABLET ORAL at 19:30

## 2017-06-30 RX ADMIN — SODIUM CHLORIDE 65 ML: 9 INJECTION, SOLUTION INTRAVENOUS at 13:32

## 2017-06-30 RX ADMIN — OXYCODONE HYDROCHLORIDE 10 MG: 5 TABLET ORAL at 16:06

## 2017-06-30 NOTE — PLAN OF CARE
Problem: Pain, Acute (Adult)  Goal: Identify Related Risk Factors and Signs and Symptoms  Related risk factors and signs and symptoms are identified upon initiation of Human Response Clinical Practice Guideline (CPG)   Outcome: No Change    06/30/17 0738   Pain, Acute   Related Risk Factors (Acute Pain) disease process;patient perception;persistent pain   Signs and Symptoms (Acute Pain) fatigue/weakness;sleep pattern alteration;social withdrawal;verbalization of pain descriptors   Tele:SR. Physical assessment per Adult PCS. Continues to request IV narcotic & declines offers of PO narcotics, non-narcotics, & non-pharmacologic pain relief options offered. Discussed w/pt need for transitioning pain regimen to PO in preparation for D/C home. Pt stated that the oxycodone gave him a headache. Encouraged pt to discuss pain medication options w/MD. Up independent in room, able to perform all ADLs independently, calls appropriately for assistance. Refer to VS, I/O, Adult PCS for full assessment & shift details. Disposition anticipated for returning home w/assistance from spouse when medically ready.  Rhonda Alcantara, RN, BSN  Medical/Telemetry - 3

## 2017-06-30 NOTE — CONSULTS
"CLINICAL NUTRITION SERVICES  -  ASSESSMENT NOTE      Malnutrition: Does not meet criteria for malnutrition at this time        REASON FOR ASSESSMENT  René Grigsby is a 36 year old male seen by Registered Dietitian for LOS.      NUTRITION HISTORY  - Information obtained from patient and family in room.  - Regular diet at home with meals TID.   - Slight decrease in appetite/intake x 2-3 days PTA d/t nausea and non-specific abdominal pain.   - NKFA.       CURRENT NUTRITION ORDERS  Diet Order:     Low-fiber    Current Intake/Tolerance:  Low-fiber diet as of 6/23.  Consuming 100% of meals per flowsheet though only ordering 1 meal daily via room service providing 400-800 kcal.  Family has been bringing in at least 1-2 meals daily.  Patient denies consuming less than baseline oral intakes over the past 3-4 days.       PHYSICAL FINDINGS  Observed  Appears well-nourished  Obtained from Chart/Interdisciplinary Team  No nutritionally pertinent     ANTHROPOMETRICS  Height: 5' 9\"  Weight: 107.3 kg (236#)  Body mass index is 34.92 kg/(m^2).  Weight Status:  Obesity Grade I BMI 30-34.9  IBW: 72.7 kg (160#)  % IBW: 148%  Weight History:  Wt Readings from Last 10 Encounters:   06/28/17 107.3 kg (236 lb 8 oz)   - Patient denies recent wt changes.     LABS  Labs reviewed    MEDICATIONS  Medications reviewed    Dosing Weight 81.4 kg - adjusted weight    ASSESSED NUTRITION NEEDS PER APPROVED PRACTICE GUIDELINES:  Estimated Energy Needs: 7065-1030 kcals (25-30 Kcal/Kg)  Justification: maintenance  Estimated Protein Needs: 81-98 grams protein (1-1.2 g pro/Kg)  Justification: maintenance  Estimated Fluid Needs: 4313-6213 mL (1 mL/Kcal)  Justification: maintenance and per provider pending fluid status    MALNUTRITION:  % Weight Loss:  None noted  % Intake:  Decreased intake does not meet criteria for malnutrition   Subcutaneous Fat Loss:  None observed  Muscle Loss:  None observed  Fluid Retention:  None noted    Malnutrition Diagnosis: " Patient does not meet two of the above criteria necessary for diagnosing malnutrition    NUTRITION DIAGNOSIS:  Predicted suboptimal nutrient intake (protein/energy) related to increasing appetite/intake over the past 3-4 days though potential for decline with previous nausea and abdominal pain.       NUTRITION INTERVENTIONS  Recommendations / Nutrition Prescription  Change to regular diet order.       Implementation  Nutrition education: Provided education on change in diet if approved/signed by MD.    Collaboration and Referral of Nutrition care: Discussed POC with team during rounds.        Nutrition Goals  Patient to consistently consume at least 50-75% of meals TID.       MONITORING AND EVALUATION:  Progress towards goals will be monitored and evaluated per protocol and Practice Guidelines        Sobia Spear RD, LD  Clinical Dietitian  3rd floor/ICU: [phone]  All other floors: [phone]  Weekend/holiday: [phone]

## 2017-06-30 NOTE — PROGRESS NOTES
"[facility]  Infectious Disease Progress Note          Assessment and Plan:   IMP 1 35 yo male 2 week illness, fever, HA, malise, weakness, doubt bacterial ? Viral, eosinophilia of sig note, ? GBS, but that does not explain total picture  2 Fever, essentially FUO, ALMANZA neg, ? resolving  3 Eosinophilia, major and ongoing    REc  1no antibioitics, so far neg cxs and serologies  2 LP wo sign CNS infection, now patch for spinal HA, improved  3 Neuro tx , weakness improved  4 Eosinophilia almost certainly significant, doubt infection, 18K eos and rapid rise of obvious concern, for completeness parasite stool, strongyloides and EBV, also recheck inflamm markers, should be elevated if this is vasculitis  5 call if issues this WE        Interval History:   no new complaints and stable; reviewed all in detail, notable eosinophilia initial 3000 eos, cxs all pending  WBC 29 K now with 15 K eos  Weakness better, not spreading  T down for 5 days              Medications:       potassium chloride SA  20 mEq Oral BID     sodium chloride (PF)  3 mL Intracatheter Q8H     ranitidine  150 mg Oral BID                  Physical Exam:   Blood pressure 130/86, pulse 77, temperature 98  F (36.7  C), temperature source Oral, resp. rate 16, height 1.753 m (5' 9\"), weight 107.3 kg (236 lb 8 oz), SpO2 93 %.  Wt Readings from Last 2 Encounters:   06/28/17 107.3 kg (236 lb 8 oz)     Vital Signs with Ranges  Temp:  [98  F (36.7  C)-98.8  F (37.1  C)] 98  F (36.7  C)  Pulse:  [77-82] 77  Heart Rate:  [65] 65  Resp:  [16] 16  BP: (115-130)/(56-86) 130/86  SpO2:  [93 %-100 %] 93 %    Constitutional: Awake, alert, cooperative, no apparent distress   Lungs: Clear to auscultation bilaterally, no crackles or wheezing   Cardiovascular: Regular rate and rhythm, normal S1 and S2, and no murmur noted   Abdomen: Normal bowel sounds, soft, non-distended, non-tender   Skin: No rashes, no cyanosis, no edema   Other:           Data:   All " microbiology laboratory data reviewed.  Recent Labs   Lab Test  06/30/17   0628  06/29/17   0658  06/28/17   0640   WBC  28.8*  25.0*  21.6*   HGB  13.5  13.0*  11.3*   HCT  39.9*  39.0*  34.6*   MCV  84  84  85   PLT  295  286  251     Recent Labs   Lab Test  06/30/17   0628  06/29/17   0658  06/27/17   0650   CR  1.04  0.94  0.88     Recent Labs   Lab Test  06/23/17   1150   SED  4     Recent Labs   Lab Test  06/24/17   1820  06/24/17   1812  06/24/17   1435  06/22/17   1910  06/22/17   1835   CULT  No growth  No growth  No growth  No growth  No growth

## 2017-06-30 NOTE — PLAN OF CARE
Problem: Goal Outcome Summary  Goal: Goal Outcome Summary  Outcome: Improving  Patient up independently in room and halls. Walked 3 times today in halls. Gave iv dilaudid once this morning and then order changed to po. Tried ibuprofen which patient reported not helping. Oxycodone given which reduced pain but patient did not want to try again, order for po dilaudid received. Left floor for ct of head.

## 2017-06-30 NOTE — PROGRESS NOTES
[facility]  Hospitalist Progress Note  Teresita Chavis MD 06/30/2017    Reason for Stay (Diagnosis): FUO         Assessment and Plan:      Summary of Stay: René Grigsby is a 36 year old otherwise healthy male  admitted on 6/22/2017 with fever/abdominal pain/bilateral le weakness.      Given the ascending nature of his weakness in the context of an acute febrile illness, neuro consulted and recommended treatment for GBS with IVIG, those symptoms have resolved.     He has not received any abx.  Evaluation for fever has included CT Chest/abdomen/pelvis only + for small bilateral pleural effusions.  Lumbar puncture traumatic but not consistent with meningitis.  West Nile IgG and IgM not completed due to hemolysis. BCx and CSF culture remain without growth.  procalcitonin low. TSH slightly high at 4.19, Free T4 pending.     Interestingly his fever is improving but now has persistent and progressive leukocytosis with a predominance of eosinophils. His wbc continues to climb although eosinophilia appears to be improving.       Problem List:   1. Fever:  Looks to be resolving, etiology remains unknown, I suspect viral-appreciate Dr. Baptiste input, cont with no abx  2. Leukocytosis with eosinophilia:  No recent travels, no allergy symptoms, works as a  and not sure if he's been exposed to mold.  Will check fungitel as screen for ? Fungal etiology.  Appreciate Dr. Mccullough's assistance  3. ? GBS:  With b le weakness on presentation, treated with IVIG/steroids as if GBS-resolved now ambulating without difficulty.  Of note EMG from 6/27 negative for GBS (? Timing issue).  Appreciate neuro input  4. Unclear constitution of symptoms as outlined above:  I suspect they are all related somehow just not clear how:A variety of tests have been ordered with mildly abnormal results of unclear clinical significance, porphyria testing initiated on a random urine sample has returned slightly elevated for a single prophyrin, ESR  "is quite high in the 110 range and crp is slightly up at 17, citlalli is elevated at 1.9.  I did a review of uptodate and have not been able to pin down a single diagnosis. Overall he appears to be improving, at discharge though he will need close follow up and perhaps eval with rheumatology  5. Abdominal pain:  Initially epigastric, no emesis/diarrhea, evaluated by Dr. Arguello who felt this was most likely due to a viral syndrome, no further w/u indicated and symptoms improved  6. Transient CP-no e/o PE, neg troponin, resolved  7. Weight gain:  2/2 fluids-resolving with IV furosemide, will d/c sched bid IV dose  DVT Prophylaxis: Pneumatic Compression Devices  Code Status: Full Code  Discharge Dispo: home  Estimated Disch Date / # of Days until Disch: unclear        Interval History (Subjective):      Continues to feel tired and complains of back pain but ambulating without difficulty.  Continues to feel weak.  No sob/n/v                  Physical Exam:      Last Vital Signs:  /75 (BP Location: Right arm)  Pulse 77  Temp 97.5  F (36.4  C) (Oral)  Resp 16  Ht 1.753 m (5' 9\")  Wt 107.3 kg (236 lb 8 oz)  SpO2 96%  BMI 34.92 kg/m2    Net neg 2 L    Pleasant nad looks stated age head nc/at sclera clear lungs ctab nl effort RRR no mrg no le edema skin w/d no c/c abd s/nt/nd alert and oriented affected area ambulating without difficulty wright            Medications:      All current medications were reviewed with changes reflected in problem list.         Data:      All new lab and imaging data was reviewed.   Labs:    Recent Labs  Lab 06/30/17  0628      POTASSIUM 4.2   CHLORIDE 105   CO2 29   ANIONGAP 5   GLC 94   BUN 12   CR 1.04   GFRESTIMATED 81   GFRESTBLACK >90African American GFR Calc   YENNY 8.2*       Recent Labs  Lab 06/30/17  0628   WBC 28.8*   HGB 13.5   HCT 39.9*   MCV 84         Imaging:           "

## 2017-06-30 NOTE — PROGRESS NOTES
Hematology Progress Note    Patient remains weak, notes exertional dyspnea. No recent abdominal pain, nausea, or diarrhea. No skin rashes.     Afebrile, VSS.  Alert, appears comfortable.     LABS today:  WBC 28.8 (H)   Hemoglobin 13.5   Hematocrit 39.9 (L)   Platelet Count 295   % Neutrophils 25.0   % Lymphocytes 16.0   % Monocytes 1.0   % Eosinophils 51.0   % Basophils 1.0   % Metamyelocytes 4.0   % Myelocytes 2.0      Peripheral blood:   Leukocytosis with left shifted neutrophils.  Slight normochromic   normocytic anemia.  Small number of red cell spherocytes present.   Eosinophilia.      COMMENT:   Nothing specific is present to identify cause of the eosinophilia.   Potential causes include but are not limited to allergic, medication,   infection (especially parasitic), inflammatory skin diseases, various   hematopoietic and nonhematopoietic neoplasms, vasculitis, etc.  The   spherocytes are morphologically nonspecific and may be related to prior transfusion, artifact, erythrocyte membrane defects, or immune related hemolysis.  Clinical correlation is required.     IMPRESSION:  Persistent leukocytosis with eosinophilia, but improved since yesterday. Additional lab studies ordered by Dr. Baptiste, including stool O&P, ESR, and CRP to evaluate for possible parasitic infection or vasculitis. Still suspect his eosinophilia is reactive given the acute rise in his eosinophil count, but if his leukocytosis and eosinophilia do not resolve over the next few weeks, will need to consider a bone marrow biopsy. Discussed with pathologist, Dr. Baptiste, and the patient.     Lincoln Mccullough M.D.  [facility]  6/30/2017   9:07 AM

## 2017-07-01 VITALS
BODY MASS INDEX: 32.78 KG/M2 | DIASTOLIC BLOOD PRESSURE: 65 MMHG | HEART RATE: 77 BPM | OXYGEN SATURATION: 97 % | TEMPERATURE: 97 F | SYSTOLIC BLOOD PRESSURE: 109 MMHG | HEIGHT: 69 IN | WEIGHT: 221.3 LBS | RESPIRATION RATE: 16 BRPM

## 2017-07-01 LAB
1,3 BETA GLUCAN SER-MCNC: 366 NG/ML
ANION GAP SERPL CALCULATED.3IONS-SCNC: 6 MMOL/L (ref 3–14)
B-D GLUCAN INTERPRETATION (1,3): ABNORMAL
BASOPHILS # BLD AUTO: 0 10E9/L (ref 0–0.2)
BASOPHILS NFR BLD AUTO: 0 %
BUN SERPL-MCNC: 12 MG/DL (ref 7–30)
CALCIUM SERPL-MCNC: 8.6 MG/DL (ref 8.5–10.1)
CHLORIDE SERPL-SCNC: 104 MMOL/L (ref 94–109)
CO2 SERPL-SCNC: 28 MMOL/L (ref 20–32)
COLLECT DURATION TIME SPEC: NORMAL H
CREAT 24H UR-MCNC: 24 MG/DL
CREAT 24H UR-MRATE: NORMAL G/(24.H)
CREAT SERPL-MCNC: 0.99 MG/DL (ref 0.66–1.25)
D-ALA 24H UR-MRATE: NORMAL MG/(24.H)
D-ALA UR-MCNC: NORMAL MG/DL
DIFFERENTIAL METHOD BLD: ABNORMAL
EOSINOPHIL # BLD AUTO: 20.4 10E9/L (ref 0–0.7)
EOSINOPHIL NFR BLD AUTO: 72 %
ERYTHROCYTE [DISTWIDTH] IN BLOOD BY AUTOMATED COUNT: 13.6 % (ref 10–15)
GFR SERPL CREATININE-BSD FRML MDRD: 86 ML/MIN/1.7M2
GLUCOSE SERPL-MCNC: 85 MG/DL (ref 70–99)
HCT VFR BLD AUTO: 39.5 % (ref 40–53)
HGB BLD-MCNC: 13 G/DL (ref 13.3–17.7)
LYMPHOCYTES # BLD AUTO: 0.9 10E9/L (ref 0.8–5.3)
LYMPHOCYTES NFR BLD AUTO: 3 %
MCH RBC QN AUTO: 27.5 PG (ref 26.5–33)
MCHC RBC AUTO-ENTMCNC: 32.9 G/DL (ref 31.5–36.5)
MCV RBC AUTO: 84 FL (ref 78–100)
METAMYELOCYTES # BLD: 0.3 10E9/L
METAMYELOCYTES NFR BLD MANUAL: 1 %
MONOCYTES # BLD AUTO: 0 10E9/L (ref 0–1.3)
MONOCYTES NFR BLD AUTO: 0 %
MYELOCYTES # BLD: 0.6 10E9/L
MYELOCYTES NFR BLD MANUAL: 2 %
NEUTROPHILS # BLD AUTO: 6.2 10E9/L (ref 1.6–8.3)
NEUTROPHILS NFR BLD AUTO: 22 %
PLATELET # BLD AUTO: 274 10E9/L (ref 150–450)
PLATELET # BLD EST: NORMAL 10*3/UL
POTASSIUM SERPL-SCNC: 4.2 MMOL/L (ref 3.4–5.3)
RBC # BLD AUTO: 4.73 10E12/L (ref 4.4–5.9)
RBC MORPH BLD: ABNORMAL
SODIUM SERPL-SCNC: 138 MMOL/L (ref 133–144)
SPECIMEN VOL ?TM UR: NORMAL ML
WBC # BLD AUTO: 28.4 10E9/L (ref 4–11)

## 2017-07-01 PROCEDURE — 25000132 ZZH RX MED GY IP 250 OP 250 PS 637: Performed by: INTERNAL MEDICINE

## 2017-07-01 PROCEDURE — 85025 COMPLETE CBC W/AUTO DIFF WBC: CPT | Performed by: INTERNAL MEDICINE

## 2017-07-01 PROCEDURE — 25000132 ZZH RX MED GY IP 250 OP 250 PS 637: Performed by: PSYCHIATRY & NEUROLOGY

## 2017-07-01 PROCEDURE — 87209 SMEAR COMPLEX STAIN: CPT | Performed by: INTERNAL MEDICINE

## 2017-07-01 PROCEDURE — 36415 COLL VENOUS BLD VENIPUNCTURE: CPT | Performed by: INTERNAL MEDICINE

## 2017-07-01 PROCEDURE — 99239 HOSP IP/OBS DSCHRG MGMT >30: CPT | Performed by: INTERNAL MEDICINE

## 2017-07-01 PROCEDURE — 80048 BASIC METABOLIC PNL TOTAL CA: CPT | Performed by: INTERNAL MEDICINE

## 2017-07-01 PROCEDURE — 87177 OVA AND PARASITES SMEARS: CPT | Performed by: INTERNAL MEDICINE

## 2017-07-01 RX ORDER — HYDROMORPHONE HYDROCHLORIDE 2 MG/1
2 TABLET ORAL EVERY 4 HOURS PRN
Qty: 20 TABLET | Refills: 0 | Status: SHIPPED | OUTPATIENT
Start: 2017-07-01

## 2017-07-01 RX ADMIN — RANITIDINE 150 MG: 150 TABLET ORAL at 08:45

## 2017-07-01 RX ADMIN — DIPHENHYDRAMINE HYDROCHLORIDE 25 MG: 25 CAPSULE ORAL at 03:47

## 2017-07-01 RX ADMIN — HYDROMORPHONE HYDROCHLORIDE 2 MG: 2 TABLET ORAL at 15:22

## 2017-07-01 RX ADMIN — POTASSIUM CHLORIDE 20 MEQ: 1500 TABLET, EXTENDED RELEASE ORAL at 08:45

## 2017-07-01 RX ADMIN — HYDROMORPHONE HYDROCHLORIDE 2 MG: 2 TABLET ORAL at 03:47

## 2017-07-01 RX ADMIN — IBUPROFEN 600 MG: 600 TABLET ORAL at 15:23

## 2017-07-01 ASSESSMENT — PAIN DESCRIPTION - DESCRIPTORS: DESCRIPTORS: BURNING

## 2017-07-01 NOTE — DISCHARGE SUMMARY
Discharge Summary  Hospitalist Service    René Grigsby MRN# [id number]   YOB: 1980 Age: 36 year old     Date of Admission:  6/22/2017  Date of Discharge:  7/1/2017  Admitting Physician:  Amy Jackson DO  Discharge Physician: Teresita Chavis MD  Discharging Service: Hospitalist Service     Primary Provider: [facility] [place]  Primary Care Physician Phone Number: [phone]         Discharge Diagnoses/Problem Oriented Hospital Course (Providers):    René Grigsby was admitted on 6/22/2017 by Amy Jackson DO and I would refer you to their history and physical.  The following problems were addressed during his hospitalization:    Fever  Abdominal pain  b le weakness  Leukocytosis 2/2 eosinophilia, with left shift  Iatrogenic volume overload  Back pain            Code Status:      Full Code        Brief Hospital Stay Summary Sent Home With Patient in AVS:       Summary of Stay: René Grigsby is a 36 year old otherwise healthy male  admitted on 6/22/2017 with fever/abdominal pain/bilateral le weakness.       Given the ascending nature of his weakness in the context of an acute febrile illness, neuro consulted and recommended treatment for GBS with IVIG, those symptoms have resolved.      He has not received any abx.  Evaluation for fever has included CT Chest/abdomen/pelvis only + for small bilateral pleural effusions.  Lumbar puncture traumatic but not consistent with meningitis.  West Nile IgG and IgM not completed due to hemolysis. BCx and CSF culture remain without growth.  procalcitonin low. TSH slightly high at 4.19, Free T4 0.90. Suspected due to either virus or perhaps autoimmune process.  His inflammatory markers remain mixed with CRP at 18 but sed red > 100.  Cydney also elevated at 1.9 but is of doubtful clinical significance.      Interestingly his fever is improving but now has persistent and progressive leukocytosis with a predominance of eosinophils. His  leukocytosis seems stable in the 28 range, and eosinophilia remains high at 75 %, there is also some e/o left shift.  I discussed the case with Dr. Mccullough, and at this time there is no plan to pursue BM bx unless this persists over a few weeks.  Because of the eosinophilia and lack of clear cut diagnosis I did discuss the case with Dr. Lopez of [facility] Rheumatology dept, does not feel that this is likely AI, except that possibly could be Churg Neal (although she has never seen a case without pulmonary complaints) nonetheless did recommend checking anca.  Per ID O and P has been sent and pending, as well as strongyloides (although has no travel to [place]) and EBV-both of those pending as well.     He is feeling well except for some ongoing back pain that he has been using low dose oral hydromorphone for initially abdominal pain and then back pain.    Other than daily blood tests, we are doing little else, he is clinically stable despite his abnormal labs.  I have discussed the case with Dr. Mccullough who agrees that discharge and close follow up a reasonable option.  I discussed this also with patient and his wife, who would like discharge which seems appropriate.           Problem List:   1. Fever:  Looks to be resolving, etiology remains unknown, ?viral ? autoimmune-appreciate Dr. Baptiste input, cont with no abx  2. Leukocytosis with eosinophilia:  No recent travels, no allergy symptoms, works as a  and not sure if he's been exposed to mold.  Fungitel returns + but on review is likely falsely elevated in the setting of IVIG, discussed with Dr. Duran of ID who states he would note the elevation but would not react to it.   Strongyloides, EBV, ANCA testing pending. I have asked that he f/u with primary MD Monday or Wednesday for recheck (Tuesday is July 4).  He and his wife have also been counseled to return for re-evaluation if declines in any way at home.   3. ? GBS:  With b le weakness on presentation, treated  with IVIG/steroids as if GBS-resolved now ambulating without difficulty.  Of note EMG from 6/27 negative for GBS (? Timing issue).  Appreciate neuro input  4. Unclear constitution of symptoms as outlined above:  I suspect they are all related somehow just not clear how:A variety of tests have been ordered with mildly abnormal results of unclear clinical significance, porphyria testing initiated on a random urine sample has returned slightly elevated for a single prophyrin, ESR is quite high in the 110 range and crp is slightly up at 17, citlalli is elevated at 1.9.  I did a review of uptodate and have not been able to pin down a single diagnosis. Overall he appears to be improving, at discharge though he will need close follow up and perhaps eval with rheumatology.  I discussed the case with Dr. Lopez of the [facility] rheum department who's only recommendation was for ANCA testing in the unlikely scenario that he has aytpical churg anna but she does not feel that this fits with a rheumatologic type process  5. Headache 2/2 lumbar puncture, back pain 2/2 LP-is improving- he is taking low dose hydromorphone and periodic ibuprofen-ok to cont both.  I have given him a limited supply of oral hydromorphone.  We discussed the risks of addiction, unintentional OD and possibility of death if taken incorrectly.  Also discussed ways to avoid constipation   6. Abdominal pain:  Initially epigastric, no emesis/diarrhea, evaluated by Dr. Arguello who felt this was most likely due to a viral syndrome, no further w/u indicated and symptoms improved  7. Transient CP-no e/o PE, neg troponin, resolved  8. Weight gain:  2/2 fluids-resolving with IV furosemide, will d/c sched bid IV dose  9. + FHx for CAD with FLP notable for low LDL, and low HDL-consider further screening for apoprotein b  10. Incidentally noted slightly dilated RV on echo-unclear clinical significance, would recommend repeat echo in 6 month  DVT Prophylaxis: Pneumatic  "Compression Devices  Code Status: Full Code  Discharge Dispo: home         Important Results:      As noted below         Pending Results:        Unresulted Labs Ordered in the Past 30 Days of this Admission     Date and Time Order Name Status Description    7/1/2017 1531 Antineutrophil cytoplasmic Nayeli IgG In process     6/30/2017 0850 EBV Capsid Antibody IgM In process     6/30/2017 0850 Ova and Parasite Exam Routine In process     6/30/2017 0850 Strongyloides antibody IgG In process     6/28/2017 0001 Porphyrins Total Reflx Fraction In process     6/27/2017 1740 Porphyrins Quantitative Urine In process     6/24/2017 1239 Lyme IgG and IgM CSF Immunoblot: Tube 3 In process             Discharge Instructions and Follow-Up:      F/U with primary MD Monday or Wednesday for recheck of CBC and to f/u outstanding blood work  Consider referral to rheumatology at that time  F/U with Dr Mccullough in 7-10 days      Discharge Disposition:      Discharged to home         Discharge Medications:        Current Discharge Medication List      START taking these medications    Details   HYDROmorphone (DILAUDID) 2 MG tablet Take 1 tablet (2 mg) by mouth every 4 hours as needed for moderate to severe pain  Qty: 20 tablet, Refills: 0    Associated Diagnoses: LUQ abdominal pain         CONTINUE these medications which have NOT CHANGED    Details   aspirin 325 MG tablet Take 650 mg by mouth daily as needed                Allergies:       No Known Allergies        Consultations This Hospital Stay:      Consultation during this admission received from hematology and infectious disease         Condition and Physical on Discharge:      Discharge condition: Stable   Vitals: Blood pressure 109/65, pulse 77, temperature 97  F (36.1  C), temperature source Axillary, resp. rate 16, height 1.753 m (5' 9\"), weight 100.4 kg (221 lb 4.8 oz), SpO2 97 %.     Constitutional: Pleasant nad looks stated age head nc/at sclera clear   Lungs: ctab nl effort "   Cardiovascular: RRR no m/r/g no le edema   Abdomen: S/nt/nd   Skin: W/d no c/c    Other: Alert and oriented alert and oriented ambulating without difficulty         Discharge Time:      Greater than 30 minutes.        Image Results From This Hospital Stay (For Non-EPIC Providers):        Results for orders placed or performed during the hospital encounter of 06/22/17   CT Abdomen Pelvis w Contrast    Narrative    CT ABDOMEN PELVIS WITH CONTRAST   6/22/2017 7:35 PM      HISTORY:  Left-sided abdominal pain for two days. Fever. Nausea and  vomiting.    TECHNIQUE:  CT abdomen and pelvis with intravenous contrast. Radiation  dose for this scan was reduced using automated exposure control,  adjustment of the mA and/or kV according to patient size, or iterative  reconstruction technique. 100mL Isovue-370     COMPARISON:  None.    FINDINGS:  Abdomen:  There are trace bilateral pleural effusions and there is  dependent atelectasis at the lung bases. Trace pericardial effusion.  The liver, spleen, gallbladder, pancreas, adrenal glands and kidneys  are normal in appearance. There is no abdominal or pelvic lymph node  enlargement.    Pelvis: There is no abdominal or pelvic lymph node enlargement. The  appendix is at the upper limits of normal. No surrounding  inflammation. No free intraperitoneal gas or fluid.      Impression    IMPRESSION:  1. Trace bilateral pleural effusions and trace pericardial effusion.  2. The appendix is at the upper limits of normal in size. No  surrounding inflammation and no convincing evidence of appendicitis.  3. No other acute abnormality. No bowel obstruction or inflammation.     JIM ESPINAL MD   Chest XR,  PA & LAT    Narrative    CHEST TWO VIEWS   6/22/2017 9:12 PM      HISTORY: Chest pain.     COMPARISON: None.    FINDINGS: The heart size is normal. The lungs are clear. No  pneumothorax or pleural effusion.      Impression    IMPRESSION:  No acute abnormality.     JIM ESPINAL MD   US  Lower Extremity Venous Duplex Bilateral    Narrative    ULTRASOUND  LOWER EXTREMITY VENOUS DUPLEX BILATERAL  6/23/2017 1:48 PM      HISTORY: Leg pain, swelling.     FINDINGS: The deep veins in the right and left lower extremity are  compressible throughout. The deep veins demonstrate normal venous  augmentation, waveforms and color Doppler flow. No evidence of  superficial thrombophlebitis.      Impression    IMPRESSION: No evidence of DVT.    EMIL WADE MD   CT Chest Pulmonary Embolism w Contrast    Narrative    CT CHEST PULMONARY EMBOLISM WITH CONTRAST  6/23/2017 2:23 PM    HISTORY: Chest pain. Fever.    TECHNIQUE: Pulmonary embolism protocol was performed. 85 mL Isovue-370  were injected IV. After contrast injection, volumetric helical  acquisition was performed from the thoracic inlet through the upper  abdomen. Radiation dose for this scan was reduced using automated  exposure control, adjustment of the mA and/or kV according to patient  size, or iterative reconstruction technique.    COMPARISON: None.    FINDINGS:  No evidence for pulmonary embolism. The thoracic aorta is  of normal caliber, without evidence for thoracic aortic aneurysm or  dissection. There are small bilateral pleural effusions. No  pericardial effusion. No enlarged lymph nodes are identified in the  chest. Mild atelectasis at both lung bases posteriorly. The lungs are  otherwise clear. Limited views of the upper abdomen are unremarkable.  No aggressive-appearing bone lesions.       Impression    IMPRESSION:   1. No evidence for pulmonary embolism or thoracic aortic dissection.    2. Small bilateral pleural effusions.      BRAD HENNESSY MD   XR Lumbar Puncture Spinal Tap Diag    Narrative    LUMBAR PUNCTURE SPINAL TAP DIAGNOSTIC 6/24/2017 3:05 PM     HISTORY: Suspect GBS.     FLUOROSCOPY TIME: 0.4 minute.    IMAGES: A total of 1 spot fluoro and/or cine loops are obtained during  exam.    PROCEDURE:  Written informed consent was obtained  from the patient  prior to the procedure. The risks and benefits were discussed and the  patient wished to continue. The patient's back was prepped, draped and  anesthetized in the usual sterile fashion utilizing 5 mL of 1%  lidocaine. Using fluoroscopic guidance, a 22-gauge spinal needle was  then advanced into the thecal sac at the L3 level with return of CSF.  A total of 10 mL of CSF was obtained in 4 tubes to send to the lab for  further analysis.    The CSF demonstrated a pinkish hue through at least the first three  tubes which became lighter each successive tube. The spinal needle was  placed without difficulty. Findings are concerning for RBCs within the  CSF. Correlate with lab findings on the specimen provided.      Impression    IMPRESSION:  Fluoroscopic guided lumbar puncture. Patient tolerated  the procedure well.         EMIL WADE MD   MR Lumbar Spine w/o Contrast    Narrative    MR LUMBAR SPINE WITHOUT CONTRAST 6/24/2017 3:56 PM    HISTORY:  Low back pain.    TECHNIQUE: Sagittal T1 and T2, sagittal IR, and transverse proton  density and T2-weighted pulse sequences.    FINDINGS: Five lumbar vertebrae are assumed. Vertebral body heights  and sagittal alignment are within normal limits. The conus medullaris  is unremarkable in appearance on the sagittal images. Disc signal and  disc heights are within normal limits throughout the lumbar spine.  Apophyseal joints appear within normal limits and no periarticular  reactive marrow edema. Transverse images from the inferior aspect of  the L1 vertebral body to the top of the sacrum reveal no evidence of  lumbar disc bulge or herniation. There is no central or foraminal  stenosis.      Impression    IMPRESSION: No lumbar degenerative loss of disc signal or disc  herniation. No stenosis or apparent neural impingement.     RADAMES BERGER MD   MR Thoracic Spine w/o Contrast    Narrative    MR THORACIC SPINE W/O CONTRAST 6/25/2017 4:18 PM     HISTORY: back  pain.    TECHNIQUE: Multiplanar multisequence MRI of the thoracic spine without  gadolinium contrast.    COMPARISON: None.    FINDINGS: The thoracic cord appears to be normal in size. It has  normal signal intensity. No lytic or destructive or infiltrative  lesions are seen.    At the T4-5 level there is dehydration of the disc. There is a mild  annular disc bulge.    At the T5-6 level there is dehydration of the disc. There is a small  left central disc herniation causing mild mass effect on the dural  sac. This is best seen on axial image 11 of series 7.    At the T6-7 level there is a small right central disc herniation  causing slight mass effect on the dural sac. This is best seen on  axial image 18 of series 8.    Small bilateral pleural effusions appear to be present. There is soft  tissue edema in the posterior paraspinal musculature is of the lower  thoracic spine and lumbar spine. The edema is better seen on the  recent MR scan of the lumbar spine.      Impression    IMPRESSION:    1. Small thoracic disc herniations at T5-6 and T6-7.  2. Small bilateral pleural effusions. These were also present on the  recent chest CT.  3. Soft tissue edema in the posterior spinal musculature. This is best  seen on the recent MR scan of the lumbar spine. This could be due to  muscle strain.       TIM ATKINS MD   XR Blood Patch    Narrative    XR BLOOD PATCH                 6/28/2017 3:17 PM       History:  Post lumbar puncture headache.     PROCEDURE:  The procedure, indications, risks (including the risk of  infection, bleeding and reaction to contrast material and  medications), and alternatives to therapy were discussed with the  patient and informed consent was obtained for the procedure.  The  lower back was prepped and draped in the usual fashion.  Lidocaine 1%  was used for local anesthesia.  Using fluoroscopic guidance, a  [place]  needle was advanced into the epidural space via interlaminar approach  at the L2-3  level.  A small amount of contrast was injected confirming  epidural location of the needle.  Subsequently, 18 mL of the patient's  blood was injected.  The needle was removed. Estimated blood loss  during the procedure was less than 5 mL. No specimens collected. The  patient tolerated the procedure well and there were no immediate  complications.        Fluoroscopy time: 0.2 minutes  Images obtained: 5      Impression    IMPRESSION:  Technically successful blood patch injection at L2-3.   Long-term results are pending.    MARICEL PARSON PA-C   CT Maxillofacial w/o Contrast    Narrative    CT MAXILLOFACIAL WITHOUT CONTRAST  6/30/2017 1:36 PM     HISTORY: Pain. Evaluate for posterior right mandibular dental abscess  or soft tissue abscess.    TECHNIQUE: Axial images were obtained through the facial region with  intravenous contrast. 100 mL of Isovue-370 was given. Radiation dose  for this scan was reduced using automated exposure control, adjustment  of the mA and/or kV according to patient size, or iterative  reconstruction technique.    FINDINGS: The bony mandible and maxilla are normal in appearance.  There is no evidence for any tooth root abscess. There is no evidence  for any soft tissue abscess. The paranasal sinuses are clear. The  visualized salivary glands are clear. Soft tissues unremarkable as  visualized.      Impression    IMPRESSION: Negative facial CT with contrast. No evidence for any  dental or soft tissue abscess.    ORION BURCH MD           Most Recent Lab Results In EPIC (For Non-EPIC Providers):    Most Recent 3 CBC's:  Recent Labs   Lab Test  07/01/17   0740  06/30/17   0628  06/29/17   0658   WBC  28.4*  28.8*  25.0*   HGB  13.0*  13.5  13.0*   MCV  84  84  84   PLT  274  295  286      Most Recent 3 BMP's:  Recent Labs   Lab Test  07/01/17   0740  06/30/17   0628  06/29/17   0658   NA  138  139  138   POTASSIUM  4.2  4.2  4.2   CHLORIDE  104  105  104   CO2  28  29  30   BUN  12  12  11   CR   0.99  1.04  0.94   ANIONGAP  6  5  4   YENNY  8.6  8.2*  8.1*   GLC  85  94  87     Most Recent 3 Troponin's:No lab results found.  Most Recent 3 INR's:No lab results found.  Most Recent 2 LFT's:  Recent Labs   Lab Test  06/23/17   1150  06/22/17   1835   AST  8  11   ALT  13  20   ALKPHOS  40  46   BILITOTAL  0.6  0.8     Most Recent Cholesterol Panel:  Recent Labs   Lab Test  06/22/17   1910   CHOL  109   LDL  57   HDL  36*   TRIG  79     Most Recent 6 Bacteria Isolates From Any Culture (See EPIC Reports for Culture Details):  Recent Labs   Lab Test  06/24/17   1820  06/24/17   1812  06/24/17   1435  06/22/17   1910  06/22/17   1835   CULT  No growth  No growth  No growth  No growth  No growth     Most Recent TSH, T4 and HgbA1c:   Recent Labs   Lab Test  06/24/17   1004   TSH  4.19*   T4  0.90     Echo  __        Interpretation Summary     A contrast injection (Bubble Study) was performed that was negative for flow  across the interatrial septum.  Compared to prior study, there is no significant change.  _____________________________________________________________________________  __        Left Ventricle  The left ventricle is normal in structure, function and size. The transmitral  spectral Doppler flow pattern is normal for age.     Right Ventricle  The right ventricle is mildly dilated. The right ventricular systolic function  is normal.     Atria  Normal left atrial size. Right atrial size is normal. A contrast injection  (Bubble Study) was performed that was negative for flow across the interatrial  septum.     Mitral Valve  The mitral valve is normal in structure and function.        Tricuspid Valve  Normal tricuspid valve.     Aortic Valve  The aortic valve is normal in structure and function.     Pulmonic Valve  Normal pulmonic valve.     Vessels  The aortic root is normal size. The inferior vena cava is not dilated.     Pericardium  There is no pericardial effusion.

## 2017-07-01 NOTE — PLAN OF CARE
Problem: Pain, Acute (Adult)  Goal: Identify Related Risk Factors and Signs and Symptoms  Related risk factors and signs and symptoms are identified upon initiation of Human Response Clinical Practice Guideline (CPG)   Outcome: No Change  VSS, pt afebrile on RA. Ambulated in halls this shift, up with SBA to bathroom. Voiding, urinal provided, good UO. No BM this shift, unable to collect specimen. PIV SL, on regular diet and tolerating clears, meds overnight. C/O pain to lower back, prn dilaudid given; c/o itching, prn benedryl given with rest after administration. Tele in place, SR w/peaked T's. Bed alarms in use, pt not attempting to exit bed. Alert and oriented, able to make needs known. Continue to monitor.

## 2017-07-01 NOTE — PLAN OF CARE
Problem: Infection, Risk/Actual (Adult)  Goal: Identify Related Risk Factors and Signs and Symptoms  Related risk factors and signs and symptoms are identified upon initiation of Human Response Clinical Practice Guideline (CPG)   Outcome: Improving  Vss. Ambulating. Denies abd pain. C/o back pain. Decreased with dilauded po. No bm for couple days. Senna given. bs + passing gas.

## 2017-07-01 NOTE — PLAN OF CARE
Problem: Goal Outcome Summary  Goal: Goal Outcome Summary  Outcome: No Change  Afebrile. VSS.  Up independent. BM sent for O & P. Tele SR.

## 2017-07-01 NOTE — PROGRESS NOTES
Pt dc'd to home at this time. Wife transporting. Pt read dc instructions and verbalized understanding. DC summary and Rx sent w/pt.

## 2017-07-01 NOTE — PROGRESS NOTES
Hematology Progress Note    Patient remains weak, fatigued. Remains afebrile, without chills. Denies recurrent abdominal pain or headaches.      Afebrile, VSS.  Alert, comfortable. Ambulating.     LABS today:  WBC 28.4 (H)   Hemoglobin 13.0 (L)   Hematocrit 39.5 (L)   Platelet Count 274   % Neutrophils 22.0   % Lymphocytes 3.0   % Eosinophils 72.0   % Basophils 0.0   % Metamyelocytes 1.0   % Myelocytes 2.0      , CRP 17.8, TORITO screen positive.    IMPRESSION:  Eosinophilia, associated with recent febrile illness and abdominal pain, clinically improved over the past week. His ESR is markedly elevated, suggesting an underlying chronic infection, autoimmune disorder, or inflammatory state such as vasculitis. His leukocytosis and eosinophilia may be reactive, but given the myeloid precursors seen in his peripheral blood, I would have a low threshold for proceeding to a bone marrow biopsy. He should probably see a rheumatologist, and we can see him in our office in 1-2 weeks for follow up of his blood abnormalities. Discussed with the patient and with Dr. Chavis.     Lincoln Mccullough M.D.  [facility]  7/1/2017   1:04 PM

## 2017-07-03 LAB
ANCA IGG TITR SER IF: NORMAL {TITER}
EBV VCA IGM SER QL IA: NORMAL AI (ref 0–0.8)
MICRO REPORT STATUS: NORMAL
O+P STL MICRO: NORMAL
PATH INTERP SPEC-IMP: NORMAL
PATH REV: NORMAL
PORPHYRINS SERPL-MCNC: NORMAL UG/DL
SPECIMEN SOURCE: NORMAL
STRONGYLOIDES IGG SER IA-ACNC: 0.96

## 2017-07-05 LAB
COLLECT DURATION TIME SPEC: NORMAL H
COPRO1/CREAT UR-SRTO: NORMAL
COPRO3/CREAT UR-SRTO: NORMAL
CREAT 24H UR-MCNC: 24 MG/DL
CREAT 24H UR-MRATE: NORMAL G/(24.H)
HEPTACARBOXYLATE/CREAT UR-SRTO: NORMAL
PORPHYRIN FRACT 24H UR-IMP: NORMAL
SPECIMEN VOL ?TM UR: NORMAL ML
UROPOR/CREAT UR-SRTO: NORMAL

## 2017-07-10 LAB
B BURGDOR IGG CSF QL IB: NORMAL
B BURGDOR IGM CSF QL IB: NORMAL

## 2020-04-06 ENCOUNTER — VIRTUAL VISIT (OUTPATIENT)
Dept: FAMILY MEDICINE | Facility: OTHER | Age: 40
End: 2020-04-06

## 2020-04-07 NOTE — PROGRESS NOTES
"Date: 2020 23:43:15  Clinician: Linn Hui  Clinician NPI: [id number]  Patient: René Grigsby  Patient : 1980  Patient Address: [address] [place] [address] [place] [address]  Patient Phone: [phone]  Visit Protocol: URI  Patient Summary:  René is a 39 year old ( : 1980 ) male who initiated a Visit for COVID-19 (Coronavirus) evaluation and screening. When asked the question \"Please sign me up to receive news, health information and promotions. \", René responded \"No\".    René states his symptoms started 1-2 days ago.   His symptoms consist of rhinitis, myalgia, a sore throat, malaise, and a headache. He is experiencing mild difficulty breathing with activities but can speak normally in full sentences. René also feels feverish but was unable to measure his temperature.   Symptom details     Nasal secretions: The color of his mucus is white and yellow.    Sore throat: René reports having mild throat pain (1-3 on a 10 point pain scale), does not have exudate on his tonsils, and can swallow liquids. He is not sure if the lymph nodes in his neck are enlarged. A rash has not appeared on the skin since the sore throat started.     Headache: He states the headache is mild (1-3 on a 10 point pain scale).      René denies having facial pain or pressure, cough, nasal congestion, ear pain, chills, diarrhea, vomiting, nausea, teeth pain, and wheezing. He also denies taking antibiotic medication for the symptoms, having recent facial or sinus surgery in the past 60 days, and having a sinus infection within the past year.   Precipitating events  René is not sure if he has been exposed to someone with strep throat. He has not recently been exposed to someone with influenza. René has been in close contact with the following high risk individuals: children under the age of 5, people with asthma, heart disease or diabetes, adults 65 or older, pregnant women, and immunocompromised people.   Pertinent " COVID-19 (Coronavirus) information  René has not traveled internationally or to the areas where COVID-19 (Coronavirus) is widespread, including cruise ship travel in the last 14 days before the start of his symptoms.   René has not had a close contact with a laboratory-confirmed COVID-19 patient within 14 days of symptom onset. He also has not had a close contact with a suspected COVID-19 patient within 14 days of symptom onset.   René does not work or volunteer as healthcare worker or a  and does not work or volunteer in a healthcare facility. He does not live with a healthcare worker.   Pertinent medical history  René does not need a return to work/school note.   Weight: 240 lbs   René does not smoke or use smokeless tobacco.   Weight: 240 lbs    MEDICATIONS: No current medications, ALLERGIES: NKDA  Clinician Response:  Dear René,  I am sorry you are not feeling well. Your health is our priority. Based on the information you have provided, it is possible that you may have some type of viral infection.  Please read the full treatment plan and see my recommendations below.  Medication information  Because you have a viral infection, antibiotics will not help you get better. Treating a viral infection with antibiotics could actually make you feel worse.  Unless you are allergic to the over-the-counter medication(s) below, I recommend using:     Acetaminophen (Tylenol or store brand) oral tablet. Take 1-2 tablets by mouth every 4-6 hours to help with the discomfort.   Over-the-counter medications do not require a prescription. Ask the pharmacist if you have any questions.  Self care  Steps you can take to be as comfortable as possible:     Rest.    Drink plenty of fluids.    Take a warm shower to loosen congestion    Use a cool-mist humidifier.    Use throat lozenges.    Suck on frozen items such as popsicles.    Drink hot tea with lemon and honey.    Gargle with warm salt water (1/4 teaspoon of  salt per 8 ounce glass of water).     Additional treatment plan   Based on the information you have provided, you do have symptoms that are consistent with Coronavirus (COVID-19).  The coronavirus causes mild to severe respiratory illness with the most common symptoms including fever, cough and difficulty breathing. Unfortunately, many viruses cause similar symptoms and it can be difficult to distinguish between viruses, especially in mild cases, so we are presuming that anyone with cough or fever has coronavirus at this time.  Coronavirus/COVID-19 has reached the point of community spread in [place], meaning that we are finding the virus in people with no known exposure risk for mare the virus. Given the increasing commonness of coronavirus in the community we are no longer testing patients who are not critically ill.  If you are a health care worker, you should refer to your employee health office for instructions about testing and returning to work.  For everyone else who has cough or fever, you should assume you are infected with coronavirus. Since you will not be tested but have symptoms that may be consistent with coronavirus, the CDC recommends you stay in self-isolation until these three things have happened:    You have had no fever for at least 72 hours (that is three full days of no fever without the use of medicine that reduces fevers)    AND   Other symptoms have improved (for example, when your cough or shortness of breath have improved)   AND   At least 7 days have passed since your symptoms first appeared.   How to Isolate:   Isolate yourself at home.  Do Not allow any visitors  Do Not go to work or school  Do Not go to [facility],  centers, shopping, or other public places.  Do Not shake hands.  Avoid close contact with others (hugging, kissing).   Protect Others:   Cover Your Mouth and Nose with a mask, disposable tissue or wash cloth to avoid spreading germs to others.  Wash your  hands and face frequently with soap and water.   We know it can be scary to hear that you might have COVID-19. Our team can help track your symptoms and make sure you are doing ok over the next two weeks using a program called [employer] to keep in touch. When you receive an email from [employer], please consider enrolling in our monitoring program. There is no cost to you for monitoring. Here is a URL where you can learn more: [email or web address]  Managing Symptoms:   At this time, we primarily recommend Tylenol (Acetaminophen) for fever or pain. If you have liver or kidney problems, contact your primary care provider for instructions on use of tylenol. Adults can take 650 mg (two 325 mg pills) by mouth every 4-6 hours as needed OR 1,000 mg (two 500 mg pills) every 8 hours as needed. MAXIMUM DAILY DOSE: 3,000mg. For children, refer to dosing on bottle based on age or weight.   If you develop significant shortness of breath that prevents you from doing normal activities, please call 911 or proceed to the nearest emergency room and alert them immediately that you have been in self-isolation for possible coronavirus.  If you have a higher risk medical condition such as cancer, heart failure, end stage renal disease on dialysis or have a transplant, please reach out to your specialist's clinic to advise them of your [facility] visit should you not improve within the next two days.   For more information about COVID19 and options for caring for yourself at home, please visit the CDC website at https://www.cdc.gov/coronavirus/2019-ncov/about/steps-when-sick.htmlFor more options for care at [facility], please visit our website at [email or web address]    COVID-19 (Coronavirus) General Information  With the increase in the number of COVID-19 (Coronavirus) cases, we understand you may have some questions. Below is some helpful information on COVID-19 (Coronavirus).  How can I  protect myself and others from the COVID-19 (Coronavirus)?  Because there is currently no vaccine to prevent infection, the best way to protect yourself is to avoid being exposed to this virus. Put distance between yourself and other people if COVID-19 (Coronavirus) is spreading in your community. The virus is thought to spread mainly from person-to-person.     Between people who are in close contact with one another (within about 6 about) for a prolonged period (10 minutes or longer).    Through respiratory droplets produced when an infected person coughs or sneezes.     The CDC recommends the following additional steps to protect yourself and others:     Wash your hands often with soap and water for at least 20 seconds, especially after blowing your nose, coughing, or sneezing; going to the bathroom; and before eating or preparing food.  Use an alcohol-based hand  that contains at least 60 percent alcohol if soap and water are not available.        Avoid touching your eyes, nose and mouth with unwashed hands.    Avoid close contact with people who are sick.    Stay home when you are sick.    Cover your cough or sneeze with a tissue, then throw the tissue in the trash.    Clean and disinfect frequently touched objects and surfaces.     You can help stop COVID-19 (Coronavirus) by knowing the signs and symptoms:     Fever    Cough    Shortness of breath     Contact your healthcare provider if   Develop symptoms   AND   Have been in close contact with a person known to have COVID-19 (Coronavirus) or live in or have recently traveled from an area with ongoing spread of COVID-19 (Coronavirus). Call ahead before you go to a doctor's office or emergency room. Tell them about your recent travel and your symptoms.   For the most up to date information, visit the CDC's website.  Self-monitoring  Self-monitoring means people should monitor themselves for fever by taking their temperatures twice a day and remain alert  for a cough or difficulty breathing.  It is important to check your health two times each day for 14 days after a potential exposure to a person with COVID-19 (Coronavirus) or after travel from a location where COVID-19 (Coronavirus) is widespread. If you have been exposed to a person with COVID-19 (Coronavirus), it may take up to 14 days to know if you will get sick. Follow the steps below to check and record your health.     Take your temperature with a thermometer twice a day, once in the morning and once in the evening, and watch for a cough or difficulty breathing for 14 days.    Write down your temperature and any COVID-19 symptoms you may have: feeling feverish, coughing, or difficulty breathing.    Stay home from work or school.    Do not take public transportation, taxis, or ride-shares.    Avoid crowded places (such as shopping centers and movie theaters) and limit your activities in public.    Keep your distance from others (about 6 feet or 2 meters).    If you get sick with fever, cough, or trouble breathing, contact your healthcare provider and tell them about your recent travel and/or your symptoms.    If you need to seek medical care for other reasons, such as dialysis, call ahead to your doctor and tell them about your recent travel.     Steps to help prevent the spread of COVID-19 (Coronavirus) if you are sick  If you are sick with COVID-19 (Coronavirus) or suspect you are infected with the virus that causes COVID-19 (Coronavirus), follow the steps below to help prevent the disease from spreading&nbsp;to people in your home and community.     Stay home except to get medical care. Home isolation may be started in consultation with your healthcare clinician.    Separate yourself from other people and animals in your home.    Call ahead before visiting your doctor if you have a medical appointment.    Wear a facemask when you are around other people.    Cover your cough and sneezes.    Clean your hands  "often.    Avoid sharing personal household items.    Clean and disinfect frequently touched objects and surfaces everyday.    You will need to have someone drop off medications or household supplies (if needed) at your house without coming inside or in contact with you or others living in your house.    Monitor your symptoms and seek prompt medical care if your illness is worsening (e.g. Difficulty breathing).    Discontinue home isolation only in consultation with your healthcare provider.     For more detailed and up to date information on what to do if you are sick, visit this link: What to Do If You Are Sick With COVID-19.  Do I need to be tested for COVID-19 (Coronavirus)?     Not everyone needs to be tested for COVID-19 (Coronavirus). Decisions on which patients receive testing will be based on the local spread of COVID-19 (Coronavirus) as well as the symptoms. Your healthcare provider will make the final decision on whether you should be tested.    In the meantime, if you have concerns that you may have been exposed, it is reasonable to practice \"social distancing.\"&nbsp; If you are ill with a cold or flu-like illness, please monitor your symptoms and call your healthcare provider if your symptoms worsen.    For more up to date information, visit this link: COVID-19 (Coronavirus) Frequently Asked Questions and Answers.      Diagnosis: Cough  Diagnosis ICD: R05  "

## 2025-04-23 NOTE — PLAN OF CARE
"Problem: Goal Outcome Summary  Goal: Goal Outcome Summary  Outcome: Improving   Temp and HR elevated. See below. Complains of dyspnea and chest pain on exertion. Numbness and tingling persists in lower extremities. Strength to LE's impaired. 1-2+ edema in bilateral LE's. Per RT, vital capacity low. 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 IVIG started at 1944 pm. Rate titrated based on protocol. Patient tolerating well so far. About an hour after starting infusion patient started experiencing chest tightness and heaviness. MD notified. Ordered to keep infusion going and monitor closely.  LP site appears clean, dry, and intact. Ibprofen and Tylenol given to control temp. Ambulates with assist of two.        06/24/17 2011 06/24/17 2049 06/24/17 2107   Vital Signs   Temp 101.1  F (38.4  C) --  102  F (38.9  C)   Temp src Oral --  Oral   Resp 18 16 18   Heart Rate 105 115 115   Pulse/Heart Rate Source Monitor Monitor Monitor   /67 102/59 114/55   BP Location Left arm Left arm Left arm       06/24/17 2133   Vital Signs   Temp 101.9  F (38.8  C)   Temp src Oral   Resp 16   Heart Rate 118   Pulse/Heart Rate Source Monitor   /50   BP Location Left arm           2137: MD paged: \"IVIG running for about an hour. Having sudden onset chest tightness and pressure. States it's worse with inspiration. VSS. Temp elevated at 101.9. HR at 118 which is unchanged from before infusion.\"    2234: MD paged- Temp 102.0 axillary. \"Unable to control with ibprofen and tylenol. New inverted T waved noted on tele this evening.\"  2245: MD ordered troponin draw and aspirin 325 mg.    " Tdap shot against whooping cough bronchitis     Prevnar 20 against strep pneumonia bacteria  -- nurse visit / pharmacy     Flu and RSV shot in Fall at pharmacy     Fructosamine test for sugar control    Urine testing on protein

## 2025-05-29 NOTE — PROGRESS NOTES
"[facility]  Infectious Disease Progress Note          Assessment and Plan:   IMP 1 35 yo male 2 week illness, fever, HA, malise, weakness, doubt bacterial ? Viral, eosinophilia of sig note, ? GBS  2 Fever, essentially FUO, ALMANZA neg, ? resolving  3 Eosinophilia, ??    REc  1no antibioitics, so far neg cxs and serologies  2 LP wo sign CNS infection, now patch for spinal HA  3 Neuro tx , weakness improved  4 Eosinophilia almost certainly significant, doubt infection, 18K eos and rapid rise of obvious concern        Interval History:   no new complaints and stable; reviewed all in detail, notable eosinophilia initial 3000 eos, cxs all pending  WBC 25 K now with 18 K eos  Weakness better, not spreading  T down for 3+ days              Medications:       potassium chloride SA  20 mEq Oral BID     furosemide  20 mg Intravenous Q12H     sodium chloride (PF)  3 mL Intracatheter Q8H     ranitidine  150 mg Oral BID                  Physical Exam:   Blood pressure 127/84, pulse 63, temperature 95.7  F (35.4  C), temperature source Oral, resp. rate 16, height 1.753 m (5' 9\"), weight 107.3 kg (236 lb 8 oz), SpO2 98 %.  Wt Readings from Last 2 Encounters:   06/28/17 107.3 kg (236 lb 8 oz)     Vital Signs with Ranges  Temp:  [95.7  F (35.4  C)-98.6  F (37  C)] 95.7  F (35.4  C)  Heart Rate:  [55-78] 72  Resp:  [16-20] 16  BP: (123-136)/(59-84) 127/84  SpO2:  [94 %-98 %] 98 %    Constitutional: Awake, alert, cooperative, no apparent distress   Lungs: Clear to auscultation bilaterally, no crackles or wheezing   Cardiovascular: Regular rate and rhythm, normal S1 and S2, and no murmur noted   Abdomen: Normal bowel sounds, soft, non-distended, non-tender   Skin: No rashes, no cyanosis, no edema   Other:           Data:   All microbiology laboratory data reviewed.  Recent Labs   Lab Test  06/29/17   0658  06/28/17   0640  06/27/17   0650  06/26/17   0607   WBC  25.0*  21.6*   --   11.8*   HGB  13.0*  11.3*   --   " 11.5*   HCT  39.0*  34.6*   --   34.8*   MCV  84  85   --   84   PLT  286  251  212  208     Recent Labs   Lab Test  06/29/17   0658  06/27/17   0650  06/26/17   0607   CR  0.94  0.88  0.82     Recent Labs   Lab Test  06/23/17   1150   SED  4     Recent Labs   Lab Test  06/24/17   1820  06/24/17   1812  06/24/17   1435  06/22/17   1910  06/22/17   1835   CULT  No growth after 5 days  No growth after 5 days  No growth  No growth  No growth        Capillary refill less/equal to 2 seconds